# Patient Record
Sex: MALE | Race: BLACK OR AFRICAN AMERICAN | Employment: FULL TIME | URBAN - METROPOLITAN AREA
[De-identification: names, ages, dates, MRNs, and addresses within clinical notes are randomized per-mention and may not be internally consistent; named-entity substitution may affect disease eponyms.]

---

## 2017-12-12 ENCOUNTER — HOSPITAL ENCOUNTER (EMERGENCY)
Age: 32
Discharge: HOME | End: 2017-12-12
Attending: EMERGENCY MEDICINE
Payer: SELF-PAY

## 2017-12-12 VITALS
DIASTOLIC BLOOD PRESSURE: 85 MMHG | HEART RATE: 90 BPM | HEIGHT: 67 IN | OXYGEN SATURATION: 98 % | WEIGHT: 176 LBS | SYSTOLIC BLOOD PRESSURE: 141 MMHG | BODY MASS INDEX: 27.62 KG/M2 | TEMPERATURE: 98.4 F | RESPIRATION RATE: 18 BRPM

## 2017-12-12 DIAGNOSIS — R10.9 NONSPECIFIC ABDOMINAL PAIN: Primary | ICD-10-CM

## 2017-12-12 LAB
ALBUMIN SERPL-MCNC: 4.5 G/DL (ref 3.5–5)
ALBUMIN/GLOB SERPL: 1.1 {RATIO} (ref 1.1–2.2)
ALP SERPL-CCNC: 84 U/L (ref 45–117)
ALT SERPL-CCNC: 39 U/L (ref 12–78)
ANION GAP SERPL CALC-SCNC: 5 MMOL/L (ref 5–15)
AST SERPL-CCNC: 22 U/L (ref 15–37)
BASOPHILS # BLD: 0 K/UL (ref 0–0.1)
BASOPHILS NFR BLD: 0 % (ref 0–1)
BILIRUB SERPL-MCNC: 0.4 MG/DL (ref 0.2–1)
BUN SERPL-MCNC: 6 MG/DL (ref 6–20)
BUN/CREAT SERPL: 7 (ref 12–20)
CALCIUM SERPL-MCNC: 9.8 MG/DL (ref 8.5–10.1)
CHLORIDE SERPL-SCNC: 101 MMOL/L (ref 97–108)
CO2 SERPL-SCNC: 31 MMOL/L (ref 21–32)
CREAT SERPL-MCNC: 0.86 MG/DL (ref 0.7–1.3)
EOSINOPHIL # BLD: 0.1 K/UL (ref 0–0.4)
EOSINOPHIL NFR BLD: 2 % (ref 0–7)
ERYTHROCYTE [DISTWIDTH] IN BLOOD BY AUTOMATED COUNT: 14.2 % (ref 11.5–14.5)
GLOBULIN SER CALC-MCNC: 4.1 G/DL (ref 2–4)
GLUCOSE SERPL-MCNC: 94 MG/DL (ref 65–100)
HCT VFR BLD AUTO: 45.8 % (ref 36.6–50.3)
HGB BLD-MCNC: 15 G/DL (ref 12.1–17)
LIPASE SERPL-CCNC: 58 U/L (ref 73–393)
LYMPHOCYTES # BLD: 2.8 K/UL (ref 0.8–3.5)
LYMPHOCYTES NFR BLD: 32 % (ref 12–49)
MCH RBC QN AUTO: 26.8 PG (ref 26–34)
MCHC RBC AUTO-ENTMCNC: 32.8 G/DL (ref 30–36.5)
MCV RBC AUTO: 81.9 FL (ref 80–99)
MONOCYTES # BLD: 0.7 K/UL (ref 0–1)
MONOCYTES NFR BLD: 8 % (ref 5–13)
NEUTS SEG # BLD: 5.1 K/UL (ref 1.8–8)
NEUTS SEG NFR BLD: 58 % (ref 32–75)
PLATELET # BLD AUTO: 241 K/UL (ref 150–400)
POTASSIUM SERPL-SCNC: 3.7 MMOL/L (ref 3.5–5.1)
PROT SERPL-MCNC: 8.6 G/DL (ref 6.4–8.2)
RBC # BLD AUTO: 5.59 M/UL (ref 4.1–5.7)
SODIUM SERPL-SCNC: 137 MMOL/L (ref 136–145)
WBC # BLD AUTO: 8.7 K/UL (ref 4.1–11.1)

## 2017-12-12 PROCEDURE — 74011250636 HC RX REV CODE- 250/636: Performed by: EMERGENCY MEDICINE

## 2017-12-12 PROCEDURE — 85025 COMPLETE CBC W/AUTO DIFF WBC: CPT | Performed by: EMERGENCY MEDICINE

## 2017-12-12 PROCEDURE — 83690 ASSAY OF LIPASE: CPT | Performed by: EMERGENCY MEDICINE

## 2017-12-12 PROCEDURE — 80053 COMPREHEN METABOLIC PANEL: CPT | Performed by: EMERGENCY MEDICINE

## 2017-12-12 PROCEDURE — 74011000250 HC RX REV CODE- 250: Performed by: EMERGENCY MEDICINE

## 2017-12-12 PROCEDURE — 36415 COLL VENOUS BLD VENIPUNCTURE: CPT | Performed by: EMERGENCY MEDICINE

## 2017-12-12 PROCEDURE — 96361 HYDRATE IV INFUSION ADD-ON: CPT

## 2017-12-12 PROCEDURE — 96374 THER/PROPH/DIAG INJ IV PUSH: CPT

## 2017-12-12 PROCEDURE — 99282 EMERGENCY DEPT VISIT SF MDM: CPT

## 2017-12-12 RX ORDER — FAMOTIDINE 10 MG/ML
20 INJECTION INTRAVENOUS
Status: COMPLETED | OUTPATIENT
Start: 2017-12-12 | End: 2017-12-12

## 2017-12-12 RX ORDER — SODIUM CHLORIDE 0.9 % (FLUSH) 0.9 %
5-10 SYRINGE (ML) INJECTION AS NEEDED
Status: DISCONTINUED | OUTPATIENT
Start: 2017-12-12 | End: 2017-12-12 | Stop reason: HOSPADM

## 2017-12-12 RX ORDER — SODIUM CHLORIDE 0.9 % (FLUSH) 0.9 %
5-10 SYRINGE (ML) INJECTION EVERY 8 HOURS
Status: DISCONTINUED | OUTPATIENT
Start: 2017-12-12 | End: 2017-12-12 | Stop reason: HOSPADM

## 2017-12-12 RX ADMIN — FAMOTIDINE 20 MG: 10 INJECTION INTRAVENOUS at 12:37

## 2017-12-12 RX ADMIN — SODIUM CHLORIDE 1000 ML: 900 INJECTION, SOLUTION INTRAVENOUS at 12:37

## 2017-12-12 NOTE — ED NOTES
Pt given printed discharge instructions and 0 script(s). Pt verbalized understanding of instructions and script(s). Pt verbalized importance of following up with PCP, given a list of PCP providers for uninsured. Pt alert and oriented, in no acute distress, ambulatory with girlfriend. Patient given work note. Patient asked what was wrong with him, I reviewed his normal lab work and advised him that it was likely a GI virus. Patient admits that he eats a lot of fast food and admits that he ate a fast food burger on the way here. I advised him to stop eating fast food because you can't be sure what is in it.

## 2017-12-12 NOTE — DISCHARGE INSTRUCTIONS
Abdominal Pain: Care Instructions  Your Care Instructions    Abdominal pain has many possible causes. Some aren't serious and get better on their own in a few days. Others need more testing and treatment. If your pain continues or gets worse, you need to be rechecked and may need more tests to find out what is wrong. You may need surgery to correct the problem. Don't ignore new symptoms, such as fever, nausea and vomiting, urination problems, pain that gets worse, and dizziness. These may be signs of a more serious problem. Your doctor may have recommended a follow-up visit in the next 8 to 12 hours. If you are not getting better, you may need more tests or treatment. The doctor has checked you carefully, but problems can develop later. If you notice any problems or new symptoms, get medical treatment right away. Follow-up care is a key part of your treatment and safety. Be sure to make and go to all appointments, and call your doctor if you are having problems. It's also a good idea to know your test results and keep a list of the medicines you take. How can you care for yourself at home? · Rest until you feel better. · To prevent dehydration, drink plenty of fluids, enough so that your urine is light yellow or clear like water. Choose water and other caffeine-free clear liquids until you feel better. If you have kidney, heart, or liver disease and have to limit fluids, talk with your doctor before you increase the amount of fluids you drink. · If your stomach is upset, eat mild foods, such as rice, dry toast or crackers, bananas, and applesauce. Try eating several small meals instead of two or three large ones. · Wait until 48 hours after all symptoms have gone away before you have spicy foods, alcohol, and drinks that contain caffeine. · Do not eat foods that are high in fat. · Avoid anti-inflammatory medicines such as aspirin, ibuprofen (Advil, Motrin), and naproxen (Aleve).  These can cause stomach upset. Talk to your doctor if you take daily aspirin for another health problem. When should you call for help? Call 911 anytime you think you may need emergency care. For example, call if:  ? · You passed out (lost consciousness). ? · You pass maroon or very bloody stools. ? · You vomit blood or what looks like coffee grounds. ? · You have new, severe belly pain. ?Call your doctor now or seek immediate medical care if:  ? · Your pain gets worse, especially if it becomes focused in one area of your belly. ? · You have a new or higher fever. ? · Your stools are black and look like tar, or they have streaks of blood. ? · You have unexpected vaginal bleeding. ? · You have symptoms of a urinary tract infection. These may include:  ¨ Pain when you urinate. ¨ Urinating more often than usual.  ¨ Blood in your urine. ? · You are dizzy or lightheaded, or you feel like you may faint. ? Watch closely for changes in your health, and be sure to contact your doctor if:  ? · You are not getting better after 1 day (24 hours). Where can you learn more? Go to http://corey-annie.info/. Enter Y860 in the search box to learn more about \"Abdominal Pain: Care Instructions. \"  Current as of: March 20, 2017  Content Version: 11.4  © 4823-8010 [employer]. Care instructions adapted under license by [employer] (which disclaims liability or warranty for this information). If you have questions about a medical condition or this instruction, always ask your healthcare professional. [place] [address] any warranty or liability for your use of this information.

## 2017-12-12 NOTE — ED NOTES
Emergency Department Nursing Plan of Care       The Nursing Plan of Care is developed from the Nursing assessment and Emergency Department Attending provider initial evaluation. The plan of care may be reviewed in the ED Provider note.     The Plan of Care was developed with the following considerations:   Patient / Family readiness to learn indicated by:verbalized understanding  Persons(s) to be included in education: patient  Barriers to Learning/Limitations:No    Signed     Ash Talavera RN    12/12/2017   1:38 PM

## 2017-12-12 NOTE — LETTER
[facility] EMERGENCY DEPT 
[address] [place] [address] [id number] [phone] Work/School Note Date: 12/12/2017 To Whom It May concern: 
 
Seble Terry was seen and treated today in the emergency room by the following provider(s): 
Attending Provider: Ria Olvera MD.   
 
Seble Terry may return to work on 12/13/17. Sincerely, Vaishali Presume

## 2017-12-12 NOTE — ED PROVIDER NOTES
EMERGENCY DEPARTMENT HISTORY AND PHYSICAL EXAM      Date: 12/12/2017  Patient Name: Yves Machado    History of Presenting Illness     Chief Complaint   Patient presents with    Abdominal Pain       History Provided By: Patient    HPI: Yves Machado, 33 y.o. male with no pertinent pmhx, presents ambulatory to the ED with cc of intermittent mild to moderate diffuse abd cramping x 3 days, worse today. Patient reports no relief with Ibuprofen at home. He state he is in no pain at this time. Pt c/o associated nausea, diarrhea and abd tightening. Pt specifically denies any vomiting, changes in appetite, difficulties urinating, CP, SOB, cough. Pt last moved his bowels today. Pt admits to tobacco use. He denies recent etoh consumption. PCP: None    There are no other complaints, changes, or physical findings at this time. Current Facility-Administered Medications   Medication Dose Route Frequency Provider Last Rate Last Dose    sodium chloride (NS) flush 5-10 mL  5-10 mL IntraVENous Q8H Ana MD Andre        sodium chloride (NS) flush 5-10 mL  5-10 mL IntraVENous PRN Ana Powell MD        sodium chloride 0.9 % bolus infusion 1,000 mL  1,000 mL IntraVENous ONCE Ana MD Andre 1,000 mL/hr at 12/12/17 1237 1,000 mL at 12/12/17 1237     Current Outpatient Prescriptions   Medication Sig Dispense Refill    amoxicillin-clavulanate (AUGMENTIN) 875-125 mg per tablet Take 1 Tab by mouth two (2) times a day. 20 Tab 0    ciprofloxacin HCl (CILOXAN) 0.3 % ophthalmic solution Apply 1 Drop to eye four (4) times daily. 2.5 mL 0    HYDROcodone-acetaminophen (NORCO) 5-325 mg per tablet Take 1 Tab by mouth every six (6) hours as needed for Pain. Max Daily Amount: 4 Tabs. 20 Tab 0    tobramycin (TOBREX) 0.3 % ophthalmic solution Administer 1 Drop to right eye every four (4) hours. 1 Bottle 0       Past History     Past Medical History:  History reviewed. No pertinent past medical history.     Past Surgical History:  History reviewed. No pertinent surgical history. Family History:  History reviewed. No pertinent family history. Social History:  Social History   Substance Use Topics    Smoking status: Current Every Day Smoker     Packs/day: 0.50    Smokeless tobacco: Never Used    Alcohol use Yes       Allergies:  No Known Allergies      Review of Systems   Review of Systems   Constitutional: Negative for fever. HENT: Negative for sore throat and trouble swallowing. Eyes: Negative for photophobia and redness. Respiratory: Negative for cough and shortness of breath. Cardiovascular: Negative for chest pain and leg swelling. Gastrointestinal: Positive for abdominal pain, diarrhea and nausea. Negative for constipation and vomiting. Endocrine: Negative for polydipsia and polyuria. Genitourinary: Negative for difficulty urinating, dysuria, hematuria and scrotal swelling. Musculoskeletal: Negative for back pain and joint swelling. Skin: Negative for rash. Neurological: Negative for dizziness, syncope, weakness and headaches. Psychiatric/Behavioral: Negative for suicidal ideas. All other systems reviewed and are negative. Physical Exam   Physical Exam   Constitutional: He is oriented to person, place, and time. He appears well-developed and well-nourished. No distress. HENT:   Head: Normocephalic and atraumatic. Mouth/Throat: Oropharynx is clear and moist. No oropharyngeal exudate. Eyes: Conjunctivae and EOM are normal. Pupils are equal, round, and reactive to light. Left eye exhibits no discharge. Neck: Normal range of motion. Neck supple. No JVD present. Cardiovascular: Normal rate, regular rhythm, normal heart sounds and intact distal pulses. Pulmonary/Chest: Effort normal and breath sounds normal. No respiratory distress. He has no wheezes. Abdominal: Soft. Bowel sounds are normal. He exhibits no distension. There is tenderness (mild).  There is no rebound and no guarding. Musculoskeletal: Normal range of motion. He exhibits no edema or tenderness. Lymphadenopathy:     He has no cervical adenopathy. Neurological: He is alert and oriented to person, place, and time. He has normal reflexes. No cranial nerve deficit. Skin: Skin is warm and dry. No rash noted. Psychiatric: He has a normal mood and affect. His behavior is normal.   Nursing note and vitals reviewed. Diagnostic Study Results     Labs -     Recent Results (from the past 12 hour(s))   CBC WITH AUTOMATED DIFF    Collection Time: 12/12/17 12:36 PM   Result Value Ref Range    WBC 8.7 4.1 - 11.1 K/uL    RBC 5.59 4. 10 - 5.70 M/uL    HGB 15.0 12.1 - 17.0 g/dL    HCT 45.8 36.6 - 50.3 %    MCV 81.9 80.0 - 99.0 FL    MCH 26.8 26.0 - 34.0 PG    MCHC 32.8 30.0 - 36.5 g/dL    RDW 14.2 11.5 - 14.5 %    PLATELET 918 189 - 773 K/uL    NEUTROPHILS 58 32 - 75 %    LYMPHOCYTES 32 12 - 49 %    MONOCYTES 8 5 - 13 %    EOSINOPHILS 2 0 - 7 %    BASOPHILS 0 0 - 1 %    ABS. NEUTROPHILS 5.1 1.8 - 8.0 K/UL    ABS. LYMPHOCYTES 2.8 0.8 - 3.5 K/UL    ABS. MONOCYTES 0.7 0.0 - 1.0 K/UL    ABS. EOSINOPHILS 0.1 0.0 - 0.4 K/UL    ABS. BASOPHILS 0.0 0.0 - 0.1 K/UL   METABOLIC PANEL, COMPREHENSIVE    Collection Time: 12/12/17 12:36 PM   Result Value Ref Range    Sodium 137 136 - 145 mmol/L    Potassium 3.7 3.5 - 5.1 mmol/L    Chloride 101 97 - 108 mmol/L    CO2 31 21 - 32 mmol/L    Anion gap 5 5 - 15 mmol/L    Glucose 94 65 - 100 mg/dL    BUN 6 6 - 20 MG/DL    Creatinine 0.86 0.70 - 1.30 MG/DL    BUN/Creatinine ratio 7 (L) 12 - 20      GFR est AA >60 >60 ml/min/1.73m2    GFR est non-AA >60 >60 ml/min/1.73m2    Calcium 9.8 8.5 - 10.1 MG/DL    Bilirubin, total 0.4 0.2 - 1.0 MG/DL    ALT (SGPT) 39 12 - 78 U/L    AST (SGOT) 22 15 - 37 U/L    Alk.  phosphatase 84 45 - 117 U/L    Protein, total 8.6 (H) 6.4 - 8.2 g/dL    Albumin 4.5 3.5 - 5.0 g/dL    Globulin 4.1 (H) 2.0 - 4.0 g/dL    A-G Ratio 1.1 1.1 - 2.2     LIPASE    Collection Time: 12/12/17 12:36 PM   Result Value Ref Range    Lipase 58 (L) 73 - 393 U/L       Radiologic Studies -   No orders to display     CT Results  (Last 48 hours)    None        CXR Results  (Last 48 hours)    None            Medical Decision Making   I am the first provider for this patient. I reviewed the vital signs, available nursing notes, past medical history, past surgical history, family history and social history. Vital Signs-Reviewed the patient's vital signs. Patient Vitals for the past 12 hrs:   Temp Pulse Resp BP SpO2   12/12/17 1210 98.4 °F (36.9 °C) 90 18 141/85 98 %       Pulse Oximetry Analysis - 98% on RA    Cardiac Monitor:   Rate: 85 bpm  Rhythm: Normal Sinus Rhythm      Records Reviewed: Nursing Notes and Old Medical Records    Provider Notes (Medical Decision Making): gastritis, diverticulitis, kidney stone, pancreatitis     ED Course:   Initial assessment performed. The patients presenting problems have been discussed, and they are in agreement with the care plan formulated and outlined with them. I have encouraged them to ask questions as they arise throughout their visit. Critical Care Time: none    Disposition:    Discharge Note:  1:29 PM  The pt is ready for discharge. The pt's signs, symptoms, diagnosis, and discharge instructions have been discussed and pt has conveyed their understanding. The pt is to follow up as recommended or return to ER should their symptoms worsen. Plan has been discussed and pt is in agreement. PLAN:  1. Current Discharge Medication List        2. Follow-up Information     Follow up With Details Comments Contact Info    None   None ([phone]) Patient stated that they have no PCP      [facility] EMERGENCY DEPT In 3 days  [address]        Return to ED if worse     Diagnosis     Clinical Impression:   1. Nonspecific abdominal pain        Attestations:     This note is prepared by Iker Butler, acting as Scribe for Jerrell Shay Keith MD.       The scribe's documentation has been prepared under my direction and personally reviewed by me in its entirety. I confirm that the note above accurately reflects all work, treatment, procedures, and medical decision making performed by me.

## 2018-08-23 ENCOUNTER — HOSPITAL ENCOUNTER (EMERGENCY)
Age: 33
Discharge: HOME | End: 2018-08-23
Attending: EMERGENCY MEDICINE
Payer: SELF-PAY

## 2018-08-23 VITALS
WEIGHT: 162.5 LBS | TEMPERATURE: 97.6 F | DIASTOLIC BLOOD PRESSURE: 74 MMHG | BODY MASS INDEX: 25.51 KG/M2 | SYSTOLIC BLOOD PRESSURE: 104 MMHG | HEART RATE: 76 BPM | OXYGEN SATURATION: 100 % | HEIGHT: 67 IN | RESPIRATION RATE: 17 BRPM

## 2018-08-23 DIAGNOSIS — G89.29 CHRONIC PAIN IN LEFT FOOT: Primary | ICD-10-CM

## 2018-08-23 DIAGNOSIS — M79.672 CHRONIC PAIN IN LEFT FOOT: Primary | ICD-10-CM

## 2018-08-23 PROCEDURE — 74011250637 HC RX REV CODE- 250/637: Performed by: PHYSICIAN ASSISTANT

## 2018-08-23 PROCEDURE — 99283 EMERGENCY DEPT VISIT LOW MDM: CPT

## 2018-08-23 RX ORDER — IBUPROFEN 400 MG/1
800 TABLET ORAL
Status: COMPLETED | OUTPATIENT
Start: 2018-08-23 | End: 2018-08-23

## 2018-08-23 RX ORDER — IBUPROFEN 800 MG/1
800 TABLET ORAL
Qty: 20 TAB | Refills: 0 | OUTPATIENT
Start: 2018-08-23 | End: 2020-07-20

## 2018-08-23 RX ADMIN — IBUPROFEN 800 MG: 400 TABLET, FILM COATED ORAL at 15:25

## 2018-08-23 NOTE — LETTER
[address] EMERGENCY DEPT 
[address] [place] [address] [facility] [id number] 
[phone] Work/School Note Date: 8/23/2018 To Whom It May concern: 
 
Santiago Sr was seen and treated today in the emergency room by the following provider(s): 
Attending Provider: Sven Lima MD 
Physician Assistant: Sandy Bennett PA-C. Santiago Sr may return to work on 8/25/18. Sincerely, Sandy Bennett PA-C

## 2018-08-23 NOTE — DISCHARGE INSTRUCTIONS
Foot Pain: Care Instructions  Your Care Instructions  Foot injuries that cause pain and swelling are fairly common. Almost all sports or home repair projects can cause a misstep that ends up as foot pain. Normal wear and tear, especially as you get older, also can cause foot pain. Most minor foot injuries will heal on their own, and home treatment is usually all you need to do. If you have a severe injury, you may need tests and treatment. Follow-up care is a key part of your treatment and safety. Be sure to make and go to all appointments, and call your doctor if you are having problems. It's also a good idea to know your test results and keep a list of the medicines you take. How can you care for yourself at home? · Take pain medicines exactly as directed. ¨ If the doctor gave you a prescription medicine for pain, take it as prescribed. ¨ If you are not taking a prescription pain medicine, ask your doctor if you can take an over-the-counter medicine. · Rest and protect your foot. Take a break from any activity that may cause pain. · Put ice or a cold pack on your foot for 10 to 20 minutes at a time. Put a thin cloth between the ice and your skin. · Prop up the sore foot on a pillow when you ice it or anytime you sit or lie down during the next 3 days. Try to keep it above the level of your heart. This will help reduce swelling. · Your doctor may recommend that you wrap your foot with an elastic bandage. Keep your foot wrapped for as long as your doctor advises. · If your doctor recommends crutches, use them as directed. · Wear roomy footwear. · As soon as pain and swelling end, begin gentle exercises of your foot. Your doctor can tell you which exercises will help. When should you call for help? Call 911 anytime you think you may need emergency care.  For example, call if:    · Your foot turns pale, white, blue, or cold.    Call your doctor now or seek immediate medical care if:    · You cannot move or stand on your foot.     · Your foot looks twisted or out of its normal position.     · Your foot is not stable when you step down.     · You have signs of infection, such as:  ¨ Increased pain, swelling, warmth, or redness. ¨ Red streaks leading from the sore area. ¨ Pus draining from a place on your foot. ¨ A fever.     · Your foot is numb or tingly.    Watch closely for changes in your health, and be sure to contact your doctor if:    · You do not get better as expected.     · You have bruises from an injury that last longer than 2 weeks. Where can you learn more? Go to http://corey-annie.info/. Enter W550 in the search box to learn more about \"Foot Pain: Care Instructions. \"  Current as of: November 29, 2017  Content Version: 11.7  © 6258-7751 [employer]. Care instructions adapted under license by [employer] (which disclaims liability or warranty for this information). If you have questions about a medical condition or this instruction, always ask your healthcare professional. [place] [address] any warranty or liability for your use of this information. Chronic Pain: Care Instructions  Your Care Instructions    Chronic pain is pain that lasts a long time (months or even years) and may or may not have a clear cause. It is different from acute pain, which usually does have a clear cause-like an injury or illness-and gets better over time. Chronic pain:  · Lasts over time but may vary from day to day. · Does not go away despite efforts to end it. · May disrupt your sleep and lead to fatigue. · May cause depression or anxiety. · May make your muscles tense, causing more pain. · Can disrupt your work, hobbies, home life, and relationships with friends and family. Chronic pain is a very real condition. It is not just in your head.  Treatment can help and usually includes several methods used together, such as medicines, physical therapy, exercise, and other treatments. Learning how to relax and changing negative thought patterns can also help you cope. Chronic pain is complex. Taking an active role in your treatment will help you better manage your pain. Tell your doctor if you have trouble dealing with your pain. You may have to try several things before you find what works best for you. Follow-up care is a key part of your treatment and safety. Be sure to make and go to all appointments, and call your doctor if you are having problems. It's also a good idea to know your test results and keep a list of the medicines you take. How can you care for yourself at home? · Pace yourself. Break up large jobs into smaller tasks. Save harder tasks for days when you have less pain, or go back and forth between hard tasks and easier ones. Take rest breaks. · Relax, and reduce stress. Relaxation techniques such as deep breathing or meditation can help. · Keep moving. Gentle, daily exercise can help reduce pain over the long run. Try low- or no-impact exercises such as walking, swimming, and stationary biking. Do stretches to stay flexible. · Try heat, cold packs, and massage. · Get enough sleep. Chronic pain can make you tired and drain your energy. Talk with your doctor if you have trouble sleeping because of pain. · Think positive. Your thoughts can affect your pain level. Do things that you enjoy to distract yourself when you have pain instead of focusing on the pain. See a movie, read a book, listen to music, or spend time with a friend. · If you think you are depressed, talk to your doctor about treatment. · Keep a daily pain diary. Record how your moods, thoughts, sleep patterns, activities, and medicine affect your pain. You may find that your pain is worse during or after certain activities or when you are feeling a certain emotion.  Having a record of your pain can help you and your doctor find the best ways to treat your pain.  · Take pain medicines exactly as directed. ¨ If the doctor gave you a prescription medicine for pain, take it as prescribed. ¨ If you are not taking a prescription pain medicine, ask your doctor if you can take an over-the-counter medicine. Reducing constipation caused by pain medicine  · Include fruits, vegetables, beans, and whole grains in your diet each day. These foods are high in fiber. · Drink plenty of fluids, enough so that your urine is light yellow or clear like water. If you have kidney, heart, or liver disease and have to limit fluids, talk with your doctor before you increase the amount of fluids you drink. · If your doctor recommends it, get more exercise. Walking is a good choice. Bit by bit, increase the amount you walk every day. Try for at least 30 minutes on most days of the week. · Schedule time each day for a bowel movement. A daily routine may help. Take your time and do not strain when having a bowel movement. When should you call for help? Call your doctor now or seek immediate medical care if:    · Your pain gets worse or is out of control.     · You feel down or blue, or you do not enjoy things like you once did. You may be depressed, which is common in people with chronic pain. Depression can be treated.     · You have vomiting or cramps for more than 2 hours.    Watch closely for changes in your health, and be sure to contact your doctor if:    · You cannot sleep because of pain.     · You are very worried or anxious about your pain.     · You have trouble taking your pain medicine.     · You have any concerns about your pain medicine.     · You have trouble with bowel movements, such as:  ¨ No bowel movement in 3 days. ¨ Blood in the anal area, in your stool, or on the toilet paper. ¨ Diarrhea for more than 24 hours. Where can you learn more? Go to http://corey-annie.info/.   Enter N004 in the search box to learn more about \"Chronic Pain: Care Instructions. \"  Current as of: October 9, 2017  Content Version: 11.7  © 6508-5026 [employer], [facility]. Care instructions adapted under license by [employer] (which disclaims liability or warranty for this information). If you have questions about a medical condition or this instruction, always ask your healthcare professional. [place] [address] any warranty or liability for your use of this information.

## 2018-08-23 NOTE — ED NOTES
Emergency Department Nursing Plan of Care       The Nursing Plan of Care is developed from the Nursing assessment and Emergency Department Attending provider initial evaluation. The plan of care may be reviewed in the ED Provider note.     The Plan of Care was developed with the following considerations:   Patient / Family readiness to learn indicated by:verbalized understanding  Persons(s) to be included in education: patient  Barriers to Learning/Limitations:No    Signed     Ralf Feliciano [facility] CELY-CRANBERRY-ER    8/23/2018   2:55 PM

## 2018-08-23 NOTE — ED NOTES
Patient (s)  given copy of dc instructions and 0 paper script(s) and 1 electronic scripts. Patient (s)  verbalized understanding of instructions and script (s). Patient given a current medication reconciliation form and verbalized understanding of their medications. Patient (s) verbalized understanding of the importance of discussing medications with  his or her physician or clinic they will be following up with. Patient alert and oriented and in no acute distress. Patient offered wheelchair from treatment area to hospital entrance, patient refused wheelchair.
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
## 2018-11-01 ENCOUNTER — HOSPITAL ENCOUNTER (EMERGENCY)
Age: 33
Discharge: HOME | End: 2018-11-01
Attending: EMERGENCY MEDICINE
Payer: SELF-PAY

## 2018-11-01 VITALS
RESPIRATION RATE: 18 BRPM | WEIGHT: 170 LBS | BODY MASS INDEX: 28.32 KG/M2 | HEART RATE: 91 BPM | SYSTOLIC BLOOD PRESSURE: 132 MMHG | OXYGEN SATURATION: 99 % | HEIGHT: 65 IN | DIASTOLIC BLOOD PRESSURE: 89 MMHG | TEMPERATURE: 99 F

## 2018-11-01 DIAGNOSIS — L03.115 CELLULITIS OF RIGHT LOWER EXTREMITY: Primary | ICD-10-CM

## 2018-11-01 PROCEDURE — 99282 EMERGENCY DEPT VISIT SF MDM: CPT

## 2018-11-01 RX ORDER — CEPHALEXIN 500 MG/1
500 CAPSULE ORAL 4 TIMES DAILY
Qty: 40 CAP | Refills: 0 | Status: SHIPPED | OUTPATIENT
Start: 2018-11-01 | End: 2018-11-11

## 2018-11-01 NOTE — LETTER
[facility] EMERGENCY DEPT 
[address] [id number] [phone] Work/School Note Date: 11/1/2018 To Whom It May concern: 
 
Brady Zhong was seen and treated today in the emergency room by the following provider(s): 
Attending Provider: Jud Duarte MD 
Physician Assistant: SEEMA Chang. Brady Zhong may return to work on 11/3/18. Sincerely, SEEMA Dan

## 2018-11-01 NOTE — ED PROVIDER NOTES
EMERGENCY DEPARTMENT HISTORY AND PHYSICAL EXAM 
 
Date: 11/1/2018 Patient Name: Carolina Sloan History of Presenting Illness Chief Complaint Patient presents with  Ankle Pain History Provided By: Patient HPI: Carolina Sloan is a 29 y.o. male with a PMH of No significant past medical history who presents with cc of right ankle pain and swelling for the past 3 days. Pt notes he had bug bites around the same area 1-2 days prior to the pain. Pt admits to scratching the area. He denies any twisting, falls or trauma to his ankle. He specifically denies any fevers, chills, nausea, vomiting, chest pain, shortness of breath, headache, rash, diarrhea, sweating or weight loss. Pt has been placing a heating pad on his ankle. He is able to ambulate with no difficulty All other ROS negative at this time Pt is in no acute distress and is speaking in full sentences PCP: None Current Outpatient Medications Medication Sig Dispense Refill  ibuprofen (MOTRIN) 800 mg tablet Take 1 Tab by mouth every eight (8) hours as needed for Pain. 20 Tab 0 Past History Past Medical History: No past medical history on file. Past Surgical History: No past surgical history on file. Family History: No family history on file. Social History: 
Social History Tobacco Use  Smoking status: Current Every Day Smoker Packs/day: 0.50  Smokeless tobacco: Never Used Substance Use Topics  Alcohol use: Yes  Drug use: No  
 
 
Allergies: 
No Known Allergies Review of Systems Review of Systems Constitutional: Negative. Negative for chills and fever. HENT: Negative. Eyes: Negative. Respiratory: Negative. Negative for shortness of breath. Cardiovascular: Negative. Negative for chest pain. Gastrointestinal: Negative. Negative for abdominal pain, diarrhea, nausea and vomiting. Endocrine: Negative. Genitourinary: Negative. Musculoskeletal: Positive for arthralgias and joint swelling. Skin: Positive for color change and rash. Allergic/Immunologic: Negative. Neurological: Negative. Negative for headaches. Hematological: Negative. Psychiatric/Behavioral: Negative. All other systems reviewed and are negative. Physical Exam  
 
Vitals:  
 11/01/18 1211 BP: 132/89 Pulse: 91  
Resp: 18 Temp: 99 °F (37.2 °C) SpO2: 99% Weight: 77.1 kg (170 lb) Height: 5' 5\" (1.651 m) Physical Exam  
Constitutional: He is oriented to person, place, and time. He appears well-developed and well-nourished. No distress. HENT:  
Head: Normocephalic and atraumatic. Right Ear: External ear normal.  
Left Ear: External ear normal.  
Nose: Nose normal.  
Mouth/Throat: Oropharynx is clear and moist.  
Eyes: Conjunctivae and EOM are normal. Pupils are equal, round, and reactive to light. Neck: Normal range of motion. Neck supple. Cardiovascular: Normal rate, regular rhythm, normal heart sounds and intact distal pulses. Pulmonary/Chest: Effort normal and breath sounds normal. No respiratory distress. He has no wheezes. He has no rales. Abdominal: Soft. Bowel sounds are normal. He exhibits no distension. There is no tenderness. There is no rebound, no guarding, no CVA tenderness, no tenderness at McBurney's point and negative Mckoy's sign. Musculoskeletal: Normal range of motion. Right ankle: He exhibits swelling. He exhibits normal range of motion, no ecchymosis, no deformity, no laceration and normal pulse. Tenderness. No lateral malleolus, no medial malleolus, no AITFL, no CF ligament, no posterior TFL, no head of 5th metatarsal and no proximal fibula tenderness found. Achilles tendon normal.  
     Left ankle: Normal.  
     Right lower leg: Normal. He exhibits no tenderness, no bony tenderness, no swelling, no edema, no deformity and no laceration. Left lower leg: Normal. He exhibits no tenderness, no bony tenderness, no swelling, no edema, no deformity and no laceration. Right foot: Normal. There is normal range of motion, no tenderness, no bony tenderness, no swelling, normal capillary refill, no crepitus, no deformity and no laceration. Left foot: Normal. There is normal range of motion, no tenderness, no bony tenderness, no swelling, normal capillary refill, no crepitus, no deformity and no laceration. Feet: 
 
Lymphadenopathy:  
  He has no cervical adenopathy. Neurological: He is alert and oriented to person, place, and time. He has normal reflexes. He displays normal reflexes. No cranial nerve deficit. He exhibits normal muscle tone. Coordination normal.  
Skin: No rash noted. He is not diaphoretic. Psychiatric: He has a normal mood and affect. His behavior is normal. Judgment and thought content normal.  
Nursing note and vitals reviewed. Diagnostic Study Results Labs - No results found for this or any previous visit (from the past 12 hour(s)). Radiologic Studies - No orders to display CT Results  (Last 48 hours) None CXR Results  (Last 48 hours) None Medical Decision Making I am the first provider for this patient. I reviewed the vital signs, available nursing notes, past medical history, past surgical history, family history and social history. Vital Signs-Reviewed the patient's vital signs. Records Reviewed: Nursing Notes, Old Medical Records, Previous Radiology Studies and Previous Laboratory Studies Disposition: 
Discharge DISCHARGE NOTE:  
Care plan outlined and precautions discussed. Patient has no new complaints, changes, or physical findings. Results of visit were reviewed with the patient. All medications were reviewed with the patient; will d/c home. All of pt's questions and concerns were addressed.  Patient was instructed and agrees to follow up with pcp, as well as to return to the ED upon further deterioration. Patient is ready to go home. Follow-up Information None Current Discharge Medication List  
  
 
 
Provider Notes (Medical Decision Making):  
Ddx: abscess, cellulitis, allergic reaction, contusion Worsening si/sxs discussed extensively Follow up with PCP or RTC if symptoms/signs worsen Side effects of medication discussed Education materials provided at discharge Pt verbalizes agreement with plan 
 
Procedures: 
Procedures Diagnosis Clinical Impression: 1. Cellulitis of right lower extremity

## 2018-11-01 NOTE — ED NOTES
Pt ambulatory in ER with c/o rt ankle pain x 3 days,denies taking any meds. Emergency Department Nursing Plan of Care The Nursing Plan of Care is developed from the Nursing assessment and Emergency Department Attending provider initial evaluation. The plan of care may be reviewed in the ED Provider note. The Plan of Care was developed with the following considerations:  
Patient / Family readiness to learn indicated by:verbalized understanding Persons(s) to be included in education: patient Barriers to Learning/Limitations:No 
 
Signed Roxanne Kessler, RN   
11/1/2018   1:00 PM

## 2018-11-01 NOTE — ED TRIAGE NOTES
Patient reports atraumatic right ankle pain and swelling x 2-3 days, pt has several bug bites to the ankle.

## 2018-11-01 NOTE — DISCHARGE INSTRUCTIONS

## 2019-06-21 ENCOUNTER — HOSPITAL ENCOUNTER (EMERGENCY)
Age: 34
Discharge: HOME | End: 2019-06-21
Attending: EMERGENCY MEDICINE
Payer: SELF-PAY

## 2019-06-21 VITALS
WEIGHT: 164 LBS | DIASTOLIC BLOOD PRESSURE: 80 MMHG | BODY MASS INDEX: 25.74 KG/M2 | OXYGEN SATURATION: 100 % | RESPIRATION RATE: 16 BRPM | HEIGHT: 67 IN | SYSTOLIC BLOOD PRESSURE: 134 MMHG | TEMPERATURE: 98.4 F | HEART RATE: 83 BPM

## 2019-06-21 DIAGNOSIS — J03.90 ACUTE TONSILLITIS, UNSPECIFIED ETIOLOGY: Primary | ICD-10-CM

## 2019-06-21 LAB — DEPRECATED S PYO AG THROAT QL EIA: NEGATIVE

## 2019-06-21 PROCEDURE — 99283 EMERGENCY DEPT VISIT LOW MDM: CPT

## 2019-06-21 PROCEDURE — 87880 STREP A ASSAY W/OPTIC: CPT

## 2019-06-21 PROCEDURE — 87070 CULTURE OTHR SPECIMN AEROBIC: CPT

## 2019-06-21 PROCEDURE — 74011250637 HC RX REV CODE- 250/637: Performed by: NURSE PRACTITIONER

## 2019-06-21 RX ORDER — DEXAMETHASONE SODIUM PHOSPHATE 10 MG/ML
10 INJECTION INTRAMUSCULAR; INTRAVENOUS ONCE
Status: DISCONTINUED | OUTPATIENT
Start: 2019-06-21 | End: 2019-06-21

## 2019-06-21 RX ORDER — DEXAMETHASONE SODIUM PHOSPHATE 10 MG/ML
10 INJECTION INTRAMUSCULAR; INTRAVENOUS ONCE
Status: COMPLETED | OUTPATIENT
Start: 2019-06-21 | End: 2019-06-21

## 2019-06-21 RX ADMIN — DEXAMETHASONE SODIUM PHOSPHATE 10 MG: 10 INJECTION INTRAMUSCULAR; INTRAVENOUS at 09:34

## 2019-06-21 NOTE — DISCHARGE INSTRUCTIONS
Patient Education        Tonsillitis: Care Instructions  Your Care Instructions    Tonsillitis is an infection of the tonsils that is caused by bacteria or a virus. The tonsils are in the back of the throat and are part of the immune system. Tonsillitis typically lasts from a few days up to a couple of weeks. Tonsillitis caused by a virus goes away on its own. Tonsillitis caused by the bacteria that causes strep throat is treated with antibiotics. You and your doctor may consider surgery to remove the tonsils (tonsillectomy) if you have serious complications or repeat infections. Follow-up care is a key part of your treatment and safety. Be sure to make and go to all appointments, and call your doctor if you are having problems. It's also a good idea to know your test results and keep a list of the medicines you take. How can you care for yourself at home? · If your doctor prescribed antibiotics, take them as directed. Do not stop taking them just because you feel better. You need to take the full course of antibiotics. · Gargle with warm salt water. This helps reduce swelling and relieve discomfort. Gargle once an hour with 1 teaspoon of salt mixed in 8 fluid ounces of warm water. · Take an over-the-counter pain medicine, such as acetaminophen (Tylenol), ibuprofen (Advil, Motrin), or naproxen (Aleve). Be safe with medicines. Read and follow all instructions on the label. No one younger than 20 should take aspirin. It has been linked to Reye syndrome, a serious illness. · Be careful when taking over-the-counter cold or flu medicines and Tylenol at the same time. Many of these medicines have acetaminophen, which is Tylenol. Read the labels to make sure that you are not taking more than the recommended dose. Too much acetaminophen (Tylenol) can be harmful. · Try an over-the-counter throat spray to relieve throat pain. · Drink plenty of fluids. Fluids may help soothe an irritated throat.  Drink warm or cool liquids (whichever feels better). These include tea, soup, and juice. · Do not smoke, and avoid secondhand smoke. Smoking can make tonsillitis worse. If you need help quitting, talk to your doctor about stop-smoking programs and medicines. These can increase your chances of quitting for good. · Use a vaporizer or humidifier to add moisture to your bedroom. Follow the directions for cleaning the machine. When should you call for help? Call your doctor now or seek immediate medical care if:    · Your pain gets worse on one side of your throat.     · You have a new or higher fever.     · You notice changes in your voice.     · You have trouble opening your mouth.     · You have any trouble breathing.     · You have much more trouble swallowing.     · You have a fever with a stiff neck or a severe headache.     · You are sensitive to light or feel very sleepy or confused.    Watch closely for changes in your health, and be sure to contact your doctor if:    · You do not get better after 2 days. Where can you learn more? Go to http://corey-annie.info/. Enter C935 in the search box to learn more about \"Tonsillitis: Care Instructions. \"  Current as of: March 27, 2018  Content Version: 11.9  © 3657-5312 [employer]. Care instructions adapted under license by [employer] (which disclaims liability or warranty for this information). If you have questions about a medical condition or this instruction, always ask your healthcare professional. [place] [address] any warranty or liability for your use of this information.

## 2019-06-21 NOTE — ED NOTES
Emergency Department Nursing Plan of Care       The Nursing Plan of Care is developed from the Nursing assessment and Emergency Department Attending provider initial evaluation. The plan of care may be reviewed in the ED Provider note.     The Plan of Care was developed with the following considerations:   Patient / Family readiness to learn indicated by:verbalized understanding  Persons(s) to be included in education: patient  Barriers to Learning/Limitations:No    Signed     Dania Tee RN    6/21/2019   9:19 AM

## 2019-06-21 NOTE — ED PROVIDER NOTES
EMERGENCY DEPARTMENT HISTORY AND PHYSICAL EXAM    Date: 6/21/2019  Patient Name: Susu Castillo    History of Presenting Illness     Chief Complaint   Patient presents with    Sore Throat         History Provided By: Patient    Chief Complaint: sore throat      HPI: Susu Castillo is a 29 y.o. male with a PMH of No significant past medical history who presents with sore throat acute onset last week. He states throat pain went away and then reoccurred today. Patient states he has been gargling without relief. Patient describes pain as scratching and burning. Pain is 8 out of 10 in severity. He reports it is painful to swallow. He denies fever or cough swollen glands. PCP: None    Current Outpatient Medications   Medication Sig Dispense Refill    ibuprofen (MOTRIN) 800 mg tablet Take 1 Tab by mouth every eight (8) hours as needed for Pain. 20 Tab 0       Past History     Past Medical History:  History reviewed. No pertinent past medical history. Past Surgical History:  History reviewed. No pertinent surgical history. Family History:  History reviewed. No pertinent family history. Social History:  Social History     Tobacco Use    Smoking status: Current Every Day Smoker     Packs/day: 0.50    Smokeless tobacco: Never Used   Substance Use Topics    Alcohol use: Yes    Drug use: No       Allergies:  No Known Allergies      Review of Systems   Review of Systems   Constitutional: Negative for chills, fatigue and fever. HENT: Positive for sore throat. Negative for congestion. Respiratory: Negative for cough and shortness of breath. Cardiovascular: Negative for chest pain. Gastrointestinal: Negative for abdominal pain. Musculoskeletal: Negative for arthralgias, back pain, myalgias, neck pain and neck stiffness. Skin: Negative for rash. Neurological: Negative for dizziness, light-headedness and headaches. All other systems reviewed and are negative.       Physical Exam     Vitals: 06/21/19 0854   BP: 134/80   Pulse: 83   Resp: 16   Temp: 98.4 °F (36.9 °C)   SpO2: 100%   Weight: 74.4 kg (164 lb)   Height: 5' 7\" (1.702 m)     Physical Exam   Constitutional: He is oriented to person, place, and time. He appears well-developed and well-nourished. HENT:   Head: Normocephalic and atraumatic. Right Ear: External ear normal.   Left Ear: External ear normal.   Mouth/Throat: Uvula is midline. Oropharyngeal exudate and posterior oropharyngeal erythema present. Eyes: Conjunctivae are normal. Right eye exhibits no discharge. Left eye exhibits no discharge. Neck: Normal range of motion. Neck supple. Cardiovascular: Normal rate and regular rhythm. Pulmonary/Chest: Effort normal and breath sounds normal. No respiratory distress. He has no wheezes. Abdominal: Soft. Bowel sounds are normal. There is no tenderness. Musculoskeletal: Normal range of motion. He exhibits no edema. Lymphadenopathy:     He has no cervical adenopathy. Neurological: He is alert and oriented to person, place, and time. No cranial nerve deficit. Skin: Skin is warm and dry. Psychiatric: He has a normal mood and affect. His behavior is normal. Judgment and thought content normal.   Nursing note and vitals reviewed. Diagnostic Study Results     Labs -     Recent Results (from the past 12 hour(s))   STREP AG SCREEN, GROUP A    Collection Time: 06/21/19  9:01 AM   Result Value Ref Range    Group A Strep Ag ID NEGATIVE  NEG         Radiologic Studies -   No orders to display     CT Results  (Last 48 hours)    None        CXR Results  (Last 48 hours)    None            Medical Decision Making   I am the first provider for this patient. I reviewed the vital signs, available nursing notes, past medical history, past surgical history, family history and social history. Vital Signs-Reviewed the patient's vital signs.     Records Reviewed: Nursing Notes            Disposition:  home    DISCHARGE NOTE:   home Care plan outlined and precautions discussed. Patient has no new complaints, changes, or physical findings. Results of tests   were reviewed with the patient. All medications were reviewed with the patient; will d/c home with amoxicillin motrin. All of pt's questions and concerns were addressed. Patient was instructed and agrees to follow up with PCP, as well as to return to the ED upon further deterioration. Patient is ready to go home. Follow-up Information     Follow up With Specialties Details Why Contact Info    [facility]  In 2 days  [address] [facility] [id number]          Discharge Medication List as of 6/21/2019  9:43 AM          Provider Notes (Medical Decision Making):   DDX pharyngitis viral pharyngitis tonsillitis procedures:  Procedures        Diagnosis     Clinical Impression:   1.  Acute tonsillitis, unspecified etiology

## 2019-06-23 LAB
BACTERIA SPEC CULT: NORMAL
SERVICE CMNT-IMP: NORMAL

## 2020-07-20 ENCOUNTER — HOSPITAL ENCOUNTER (EMERGENCY)
Age: 35
Discharge: HOME | End: 2020-07-20
Attending: EMERGENCY MEDICINE
Payer: COMMERCIAL

## 2020-07-20 VITALS
RESPIRATION RATE: 16 BRPM | DIASTOLIC BLOOD PRESSURE: 83 MMHG | HEART RATE: 76 BPM | BODY MASS INDEX: 27 KG/M2 | WEIGHT: 172 LBS | SYSTOLIC BLOOD PRESSURE: 144 MMHG | OXYGEN SATURATION: 98 % | TEMPERATURE: 97.9 F | HEIGHT: 67 IN

## 2020-07-20 DIAGNOSIS — N34.2 URETHRITIS: Primary | ICD-10-CM

## 2020-07-20 LAB
APPEARANCE UR: CLEAR
BACTERIA URNS QL MICRO: NEGATIVE /HPF
BILIRUB UR QL CFM: NEGATIVE
COLOR UR: ABNORMAL
EPITH CASTS URNS QL MICRO: ABNORMAL /LPF
GLUCOSE UR STRIP.AUTO-MCNC: NEGATIVE MG/DL
HGB UR QL STRIP: NEGATIVE
KETONES UR QL STRIP.AUTO: ABNORMAL MG/DL
LEUKOCYTE ESTERASE UR QL STRIP.AUTO: ABNORMAL
NITRITE UR QL STRIP.AUTO: NEGATIVE
PH UR STRIP: 6.5 [PH] (ref 5–8)
PROT UR STRIP-MCNC: 30 MG/DL
RBC #/AREA URNS HPF: ABNORMAL /HPF (ref 0–5)
SP GR UR REFRACTOMETRY: 1.02 (ref 1–1.03)
UA: UC IF INDICATED,UAUC: ABNORMAL
UROBILINOGEN UR QL STRIP.AUTO: 1 EU/DL (ref 0.2–1)
WBC URNS QL MICRO: ABNORMAL /HPF (ref 0–4)

## 2020-07-20 PROCEDURE — 87086 URINE CULTURE/COLONY COUNT: CPT

## 2020-07-20 PROCEDURE — 96372 THER/PROPH/DIAG INJ SC/IM: CPT

## 2020-07-20 PROCEDURE — 74011000250 HC RX REV CODE- 250: Performed by: NURSE PRACTITIONER

## 2020-07-20 PROCEDURE — 74011250637 HC RX REV CODE- 250/637: Performed by: NURSE PRACTITIONER

## 2020-07-20 PROCEDURE — 87491 CHLMYD TRACH DNA AMP PROBE: CPT

## 2020-07-20 PROCEDURE — 74011250636 HC RX REV CODE- 250/636: Performed by: NURSE PRACTITIONER

## 2020-07-20 PROCEDURE — 99283 EMERGENCY DEPT VISIT LOW MDM: CPT

## 2020-07-20 PROCEDURE — 81001 URINALYSIS AUTO W/SCOPE: CPT

## 2020-07-20 RX ORDER — CEPHALEXIN 500 MG/1
500 CAPSULE ORAL 2 TIMES DAILY
Qty: 14 CAP | Refills: 0 | Status: SHIPPED | OUTPATIENT
Start: 2020-07-20 | End: 2020-07-27

## 2020-07-20 RX ORDER — AZITHROMYCIN 500 MG/1
1000 TABLET, FILM COATED ORAL
Status: COMPLETED | OUTPATIENT
Start: 2020-07-20 | End: 2020-07-20

## 2020-07-20 RX ADMIN — AZITHROMYCIN MONOHYDRATE 1000 MG: 500 TABLET ORAL at 15:42

## 2020-07-20 RX ADMIN — LIDOCAINE HYDROCHLORIDE 250 MG: 10 INJECTION, SOLUTION EPIDURAL; INFILTRATION; INTRACAUDAL; PERINEURAL at 15:42

## 2020-07-20 NOTE — ED NOTES
Provider at bedside reviewing plan of care and F/U instruction. Pt accepted plan of care and left unit steady gait. Patient (s)  given copy of dc instructions and 1 script(s). Patient (s)  verbalized understanding of instructions and script (s). Patient given a current medication reconciliation form and verbalized understanding of their medications. Patient (s) verbalized understanding of the importance of discussing medications with  his or her physician or clinic they will be following up with. Patient alert and oriented and in no acute distress.   Patient discharged home ambulatory with self

## 2020-07-20 NOTE — DISCHARGE INSTRUCTIONS
Patient Education        Urethritis: Care Instructions  Your Care Instructions     Urethritis is an infection of the tube that takes urine from the bladder to the outside of the body. This tube is called the urethra. The infection is often caused by bacteria. This can happen if you have a sexually transmitted infection (STI). But a virus may also be a cause. Urethritis is usually treated with antibiotics. Most cases clear up with treatment. Proper treatment is very important. If you don't treat it, the infection can lead to lasting damage of the urethra. Other parts of the urinary system can also be damaged. Follow-up care is a key part of your treatment and safety. Be sure to make and go to all appointments, and call your doctor if you are having problems. It's also a good idea to know your test results and keep a list of the medicines you take. How can you care for yourself at home? · If your doctor prescribed antibiotics, take them as directed. Do not stop taking them just because you feel better. You need to take the full course of antibiotics. · Take an over-the-counter pain medicine, such as acetaminophen (Tylenol), ibuprofen (Advil, Motrin), or naproxen (Aleve), if needed. Be safe with medicines. Read and follow all instructions on the label. · Do not take two or more pain medicines at the same time unless the doctor told you to. Many pain medicines have acetaminophen, which is Tylenol. Too much acetaminophen (Tylenol) can be harmful. · Your doctor may have you take phenazopyridine (Pyridium). This is a pain medicine for the urinary tract. It can turn your urine a deep red-orange. This is normal. Call your doctor if you think you are having a problem with your medicine. · Do not have sex until you are done with treatment. If you do have sex, be sure to use a condom. Your sex partner or partners should be tested too if your urethritis was caused by an STI.   · If your infection was caused by an injury or chemicals, avoid those things if you can. When should you call for help? Call your doctor now or seek immediate medical care if:  · You can't urinate. · You have symptoms of a urinary infection. For example:  ? You have blood or pus in your urine. ? You have pain in your back just below your rib cage. This is called flank pain. ? You have a fever, chills, or body aches. ? It hurts to urinate. ? You have groin or belly pain. · You have a hard time urinating when your bladder is full. · You notice mental changes or feel confused. Watch closely for changes in your health, and be sure to contact your doctor if:  · You do not get better as expected. Where can you learn more? Go to http://www.gray.com/  Enter O355 in the search box to learn more about \"Urethritis: Care Instructions. \"  Current as of: August 22, 2019               Content Version: 12.5  © 0430-2527 Healthwise, Incorporated. Care instructions adapted under license by "[employer]" (which disclaims liability or warranty for this information). If you have questions about a medical condition or this instruction, always ask your healthcare professional. [address] any warranty or liability for your use of this information.

## 2020-07-20 NOTE — LETTER
7/22/2020 Marizol Able 
[address] [place] [phone] [address] Dear Mr. Leanne Boateng You were seen in the Emergency Department of [address] [place] [address] on 7/20/20 and had lab radiology tests performed. The chlamydia from your Emergency Department visit on 7/20/20 was positive. You were treated appropriately during your visit. No further treatment is required. Your partner needs to be treated. If you have any questions please contact the Emergency Department at [phone]. Sincerely, Nery Carranza PA-C 
 
[facility] EMERGENCY DEPT 
[address] [place] [id number] [phone]

## 2020-07-20 NOTE — ED NOTES
Pt here for evaluation of STI. According to pt his girl friend sts she was dx with STI. Pt denies any problems with urination. Pt is A+Ox3 clear speaking. Emergency Department Nursing Plan of Care       The Nursing Plan of Care is developed from the Nursing assessment and Emergency Department Attending provider initial evaluation. The plan of care may be reviewed in the ED Provider note.     The Plan of Care was developed with the following considerations:   Patient / Family readiness to learn indicated by:verbalized understanding  Persons(s) to be included in education: patient  Barriers to Learning/Limitations:No    Signed     Kimber Thompson RN    7/20/2020   3:22 PM

## 2020-07-20 NOTE — ED PROVIDER NOTES
EMERGENCY DEPARTMENT HISTORY AND PHYSICAL EXAM    Date: 7/20/2020  Patient Name: Umberto Pryor    History of Presenting Illness     Chief Complaint   Patient presents with    Exposure to STD         History Provided By: Patient      HPI: Umberto Pryor is a 36 y.o. male with a PMH of No significant past medical history who presents with STD exposure. Pt reports being informed by sexual partner that she has chlamydia. Denies penile or urinary changes. Reports receiving oral sex unprotected from another female. Denies hx of STD     PCP: None    Current Outpatient Medications   Medication Sig Dispense Refill    cephALEXin (Keflex) 500 mg capsule Take 1 Cap by mouth two (2) times a day for 7 days. 14 Cap 0       Past History     Past Medical History:  History reviewed. No pertinent past medical history. Past Surgical History:  History reviewed. No pertinent surgical history. Family History:  History reviewed. No pertinent family history. Social History:  Social History     Tobacco Use    Smoking status: Current Every Day Smoker     Packs/day: 0.50    Smokeless tobacco: Never Used   Substance Use Topics    Alcohol use: Yes    Drug use: No       Allergies:  No Known Allergies      Review of Systems   Review of Systems   Constitutional: Negative for chills, diaphoresis, fatigue and fever. HENT: Negative for sore throat. Respiratory: Negative for cough and shortness of breath. Cardiovascular: Negative for chest pain and leg swelling. Gastrointestinal: Negative for abdominal pain, nausea and vomiting. Genitourinary: Negative for discharge, dysuria, frequency, genital sores, hematuria, penile pain, penile swelling, scrotal swelling, testicular pain and urgency. Musculoskeletal: Negative for arthralgias and back pain. Skin: Negative for rash. Neurological: Negative for dizziness, numbness and headaches. All other systems reviewed and are negative.       Physical Exam     Vitals:    07/20/20 1504 07/20/20 1522 07/20/20 1647   BP: 144/83     Pulse: 76     Resp: 16     Temp: 97.9 °F (36.6 °C)     SpO2: 98% 98% 98%   Weight: 78 kg (172 lb)     Height: 5' 7\" (1.702 m)       Physical Exam  Vitals signs and nursing note reviewed. Constitutional:       General: He is not in acute distress. Appearance: He is well-developed. He is not ill-appearing. HENT:      Head: Normocephalic and atraumatic. Right Ear: Tympanic membrane, ear canal and external ear normal.      Left Ear: Tympanic membrane, ear canal and external ear normal.      Nose: Nose normal.      Mouth/Throat:      Mouth: Mucous membranes are moist.      Pharynx: No oropharyngeal exudate. Eyes:      Extraocular Movements: Extraocular movements intact. Conjunctiva/sclera: Conjunctivae normal.      Pupils: Pupils are equal, round, and reactive to light. Neck:      Musculoskeletal: Normal range of motion and neck supple. Cardiovascular:      Rate and Rhythm: Normal rate and regular rhythm. Pulses: Normal pulses. Heart sounds: Normal heart sounds. Pulmonary:      Effort: Pulmonary effort is normal.      Breath sounds: Normal breath sounds. Abdominal:      General: Bowel sounds are normal. There is no distension. Palpations: Abdomen is soft. Tenderness: There is no abdominal tenderness. There is no guarding or rebound. Genitourinary:     Comments:  deferred   Lymphadenopathy:      Cervical: No cervical adenopathy. Skin:     General: Skin is warm and dry. Neurological:      Mental Status: He is alert and oriented to person, place, and time. GCS: GCS eye subscore is 4. GCS verbal subscore is 5. GCS motor subscore is 6. Cranial Nerves: No cranial nerve deficit. Psychiatric:         Thought Content:  Thought content normal.           Diagnostic Study Results     Labs -     Recent Results (from the past 12 hour(s))   URINALYSIS W/ REFLEX CULTURE    Collection Time: 07/20/20  3:42 PM    Specimen: Miscellaneous sample; Urine    Urine specimen   Result Value Ref Range    Color DARK YELLOW      Appearance CLEAR CLEAR      Specific gravity 1.020 1.003 - 1.030      pH (UA) 6.5 5.0 - 8.0      Protein 30 (A) NEG mg/dL    Glucose Negative NEG mg/dL    Ketone TRACE (A) NEG mg/dL    Blood Negative NEG      Urobilinogen 1.0 0.2 - 1.0 EU/dL    Nitrites Negative NEG      Leukocyte Esterase MODERATE (A) NEG      WBC 20-50 0 - 4 /hpf    RBC 0-5 0 - 5 /hpf    Epithelial cells FEW FEW /lpf    Bacteria Negative NEG /hpf    UA:UC IF INDICATED URINE CULTURE ORDERED (A) CNI     BILIRUBIN, CONFIRM    Collection Time: 07/20/20  3:42 PM   Result Value Ref Range    Bilirubin UA, confirm Negative NEG         Radiologic Studies -   No orders to display     CT Results  (Last 48 hours)    None        CXR Results  (Last 48 hours)    None            Medical Decision Making   I am the first provider for this patient. I reviewed the vital signs, available nursing notes, past medical history, past surgical history, family history and social history. Vital Signs-Reviewed the patient's vital signs. Records Reviewed: Nursing Notes and Old Medical Records     33yo M with STD exposure. Empiric treatment provided. Advised to notify partner of STD testing and treatment. Recommend to avoid sex for 1 week. Advised he will be notified in 3-4 days if sx are positive. ED Course as of Jul 20 1649   Mon Jul 20, 2020   1543 Awaiting urine results at this time      [NA]      ED Course User Index  [NA] Cele March NP          Disposition:  Discharge     DISCHARGE NOTE:       Care plan outlined and precautions discussed. Patient has no new complaints, changes, or physical findings. All of pt's questions and concerns were addressed. Patient was instructed and agrees to follow up with health department, as well as to return to the ED upon further deterioration. Patient is ready to go home.     Follow-up Information     Follow up With Specialties Details Why [address]   As needed, If symptoms worsen [address] [id number] [phone]          Discharge Medication List as of 7/20/2020  4:29 PM      START taking these medications    Details   cephALEXin (Keflex) 500 mg capsule Take 1 Cap by mouth two (2) times a day for 7 days. , Normal, Disp-14 Cap,R-0         STOP taking these medications       ibuprofen (MOTRIN) 800 mg tablet Comments:   Reason for Stopping:               Provider Notes (Medical Decision Making):   DDX: Chlamydia, Gonorrhea, urethritis, Candidiasis, Trichomonas, UTI    Procedures:  Procedures    Please note that this dictation was completed with Dragon, computer voice recognition software. Quite often unanticipated grammatical, syntax, homophones, and other interpretive errors are inadvertently transcribed by the computer software. Please disregard these errors. Additionally, please excuse any errors that have escaped final proofreading. Diagnosis     Clinical Impression:   1.  Urethritis

## 2020-07-21 LAB
BACTERIA SPEC CULT: NORMAL
SERVICE CMNT-IMP: NORMAL

## 2020-07-22 LAB
C TRACH DNA SPEC QL NAA+PROBE: POSITIVE
N GONORRHOEA DNA SPEC QL NAA+PROBE: NEGATIVE
SAMPLE TYPE: ABNORMAL
SERVICE CMNT-IMP: ABNORMAL
SPECIMEN SOURCE: ABNORMAL

## 2020-07-30 ENCOUNTER — HOSPITAL ENCOUNTER (EMERGENCY)
Age: 35
Discharge: HOME | End: 2020-07-30
Attending: EMERGENCY MEDICINE | Admitting: EMERGENCY MEDICINE
Payer: COMMERCIAL

## 2020-07-30 VITALS
WEIGHT: 172 LBS | RESPIRATION RATE: 18 BRPM | SYSTOLIC BLOOD PRESSURE: 137 MMHG | DIASTOLIC BLOOD PRESSURE: 85 MMHG | HEART RATE: 83 BPM | HEIGHT: 67 IN | TEMPERATURE: 97.8 F | BODY MASS INDEX: 27 KG/M2 | OXYGEN SATURATION: 99 %

## 2020-07-30 DIAGNOSIS — M54.10 RADICULOPATHY AFFECTING UPPER EXTREMITY: ICD-10-CM

## 2020-07-30 DIAGNOSIS — S46.812A TRAPEZIUS MUSCLE STRAIN, LEFT, INITIAL ENCOUNTER: Primary | ICD-10-CM

## 2020-07-30 PROCEDURE — 99282 EMERGENCY DEPT VISIT SF MDM: CPT

## 2020-07-30 RX ORDER — CYCLOBENZAPRINE HCL 10 MG
10 TABLET ORAL
Qty: 15 TAB | Refills: 0 | Status: SHIPPED | OUTPATIENT
Start: 2020-07-30

## 2020-07-30 RX ORDER — PREDNISONE 5 MG/1
TABLET ORAL
Qty: 21 TAB | Refills: 0 | Status: SHIPPED | OUTPATIENT
Start: 2020-07-30

## 2020-07-30 NOTE — DISCHARGE INSTRUCTIONS
Patient Education        Back Pain: Care Instructions  Your Care Instructions     Back pain has many possible causes. It is often related to problems with muscles and ligaments of the back. It may also be related to problems with the nerves, discs, or bones of the back. Moving, lifting, standing, sitting, or sleeping in an awkward way can strain the back. Sometimes you don't notice the injury until later. Arthritis is another common cause of back pain. Although it may hurt a lot, back pain usually improves on its own within several weeks. Most people recover in 12 weeks or less. Using good home treatment and being careful not to stress your back can help you feel better sooner. Follow-up care is a key part of your treatment and safety. Be sure to make and go to all appointments, and call your doctor if you are having problems. It's also a good idea to know your test results and keep a list of the medicines you take. How can you care for yourself at home? · Sit or lie in positions that are most comfortable and reduce your pain. Try one of these positions when you lie down:  ? Lie on your back with your knees bent and supported by large pillows. ? Lie on the floor with your legs on the seat of a sofa or chair. ? Lie on your side with your knees and hips bent and a pillow between your legs. ? Lie on your stomach if it does not make pain worse. · Do not sit up in bed, and avoid soft couches and twisted positions. Bed rest can help relieve pain at first, but it delays healing. Avoid bed rest after the first day of back pain. · Change positions every 30 minutes. If you must sit for long periods of time, take breaks from sitting. Get up and walk around, or lie in a comfortable position. · Try using a heating pad on a low or medium setting for 15 to 20 minutes every 2 or 3 hours. Try a warm shower in place of one session with the heating pad. · You can also try an ice pack for 10 to 15 minutes every 2 to 3 hours. Put a thin cloth between the ice pack and your skin. · Take pain medicines exactly as directed. ? If the doctor gave you a prescription medicine for pain, take it as prescribed. ? If you are not taking a prescription pain medicine, ask your doctor if you can take an over-the-counter medicine. · Take short walks several times a day. You can start with 5 to 10 minutes, 3 or 4 times a day, and work up to longer walks. Walk on level surfaces and avoid hills and stairs until your back is better. · Return to work and other activities as soon as you can. Continued rest without activity is usually not good for your back. · To prevent future back pain, do exercises to stretch and strengthen your back and stomach. Learn how to use good posture, safe lifting techniques, and proper body mechanics. When should you call for help? Call your doctor now or seek immediate medical care if:  · You have new or worsening numbness in your legs. · You have new or worsening weakness in your legs. (This could make it hard to stand up.)  · You lose control of your bladder or bowels. Watch closely for changes in your health, and be sure to contact your doctor if:  · You have a fever, lose weight, or don't feel well. · You do not get better as expected. Where can you learn more? Go to http://corey-annie.info/  Enter I594 in the search box to learn more about \"Back Pain: Care Instructions. \"  Current as of: March 2, 2020               Content Version: 12.5  © 2789-8285 Healthwise, Incorporated. Care instructions adapted under license by [employer] (which disclaims liability or warranty for this information). If you have questions about a medical condition or this instruction, always ask your healthcare professional. [place] [address] any warranty or liability for your use of this information.          Patient Education        Pinched Nerve in the Neck: Care Instructions  Your Care Instructions  A pinched nerve in the neck happens when a vertebra or disc in the upper part of your spine is damaged. This damage can happen because of an injury. Or it can just happen with age. The changes caused by the damage may put pressure on a nearby nerve root, pinching it. This causes symptoms such as sharp pain in your neck, shoulder, arm, hand, or back. You may also have tingling or numbness. Sometimes it makes your arm weaker. The symptoms are usually worse when you turn your head or strain your neck. For many people, the symptoms get better over time and finally go away. Early treatment usually includes medicines for pain and swelling. Sometimes physical therapy and special exercises may help. Follow-up care is a key part of your treatment and safety. Be sure to make and go to all appointments, and call your doctor if you are having problems. It's also a good idea to know your test results and keep a list of the medicines you take. How can you care for yourself at home? · Be safe with medicines. Read and follow all instructions on the label. ¨ If the doctor gave you a prescription medicine for pain, take it as prescribed. ¨ If you are not taking a prescription pain medicine, ask your doctor if you can take an over-the-counter medicine. · Try using a heating pad on a low or medium setting for 15 to 20 minutes every 2 or 3 hours. Try a warm shower in place of one session with the heating pad. You can also buy single-use heat wraps that last up to 8 hours. · You can also try an ice pack for 10 to 15 minutes every 2 to 3 hours. There isn't strong evidence that either heat or ice will help. But you can try them to see if they help you. · Don't spend too long in one position. Take short breaks to move around and change positions. · Wear a seat belt and shoulder harness when you are in a car. · Sleep with a pillow under your head and neck that keeps your neck straight.   · If you were given a neck brace (cervical collar) to limit neck motion, wear it as instructed for as many days as your doctor tells you to. Do not wear it longer than you were told to. Wearing a brace for too long can lead to neck stiffness and can weaken the neck muscles. · Follow your doctor's instructions for gentle neck-stretching exercises. · Do not smoke. Smoking can slow healing of your discs. If you need help quitting, talk to your doctor about stop-smoking programs and medicines. These can increase your chances of quitting for good. · Avoid strenuous work or exercise until your doctor says it is okay. When should you call for help? Call 911 anytime you think you may need emergency care. For example, call if:  ? · You are unable to move an arm or a leg at all. ?Call your doctor now or seek immediate medical care if:  ? · You have new or worse symptoms in your arms, legs, chest, belly, or buttocks. Symptoms may include:  ¨ Numbness or tingling. ¨ Weakness. ¨ Pain. ? · You lose bladder or bowel control. ? Watch closely for changes in your health, and be sure to contact your doctor if:  ? · You are not getting better as expected. Where can you learn more? Go to http://www.gray.com/. Enter M647 in the search box to learn more about \"Pinched Nerve in the Neck: Care Instructions. \"  Current as of: March 21, 2017  Content Version: 11.5  © 6319-2671 [employer]. Care instructions adapted under license by [employer] (which disclaims liability or warranty for this information). If you have questions about a medical condition or this instruction, always ask your healthcare professional. [place] [address] any warranty or liability for your use of this information. Patient Education        Healthy Upper Back: Exercises  Introduction  Here are some examples of exercises for your upper back. Start each exercise slowly.  Ease off the exercise if you start to have pain.  Your doctor or physical therapist will tell you when you can start these exercises and which ones will work best for you. How to do the exercises  Lower neck and upper back stretch   1. Stretch your arms out in front of your body. Clasp one hand on top of your other hand. 2. Gently reach out so that you feel your shoulder blades stretching away from each other. 3. Gently bend your head forward. 4. Hold for 15 to 30 seconds. 5. Repeat 2 to 4 times. Midback stretch   If you have knee pain, do not do this exercise. 1. Kneel on the floor, and sit back on your ankles. 2. Lean forward, place your hands on the floor, and stretch your arms out in front of you. Rest your head between your arms. 3. Gently push your chest toward the floor, reaching as far in front of you as possible. 4. Hold for 15 to 30 seconds. 5. Repeat 2 to 4 times. Shoulder rolls   1. Sit comfortably with your feet shoulder-width apart. You can also do this exercise while standing. 2. Roll your shoulders up, then back, and then down in a smooth, circular motion. 3. Repeat 2 to 4 times. Wall push-up   1. Stand against a wall with your feet about 12 to 24 inches back from the wall. If you feel any pain when you do this exercise, stand closer to the wall. 2. Place your hands on the wall slightly wider apart than your shoulders, and lean forward. 3. Gently lean your body toward the wall. Then push back to your starting position. Keep the motion smooth and controlled. 4. Repeat 8 to 12 times. Resisted shoulder blade squeeze   For this exercise, you will need elastic exercise material, such as surgical tubing or Thera-Band. 1. Sit or stand, holding the band in both hands in front of you. Keep your elbows close to your sides, bent at a 90-degree angle. Your palms should face up. 2. Squeeze your shoulder blades together, and move your arms to the outside, stretching the band.  Be sure to keep your elbows at your sides while you do this. 3. Relax. 4. Repeat 8 to 12 times. Resisted rows   For this exercise, you will need elastic exercise material, such as surgical tubing or Thera-Band. 1. Put the band around a solid object, such as a bedpost, at about waist level. Hold one end of the band in each hand. 2. With your elbows at your sides and bent to 90 degrees, pull the band back to move your shoulder blades toward each other. Return to the starting position. 3. Repeat 8 to 12 times. Follow-up care is a key part of your treatment and safety. Be sure to make and go to all appointments, and call your doctor if you are having problems. It's also a good idea to know your test results and keep a list of the medicines you take. Where can you learn more? Go to http://corey-annie.info/  Enter C690 in the search box to learn more about \"Healthy Upper Back: Exercises. \"  Current as of: March 2, 2020               Content Version: 12.5  © 2006-2020 Healthwise, Incorporated. Care instructions adapted under license by [employer] (which disclaims liability or warranty for this information). If you have questions about a medical condition or this instruction, always ask your healthcare professional. [address] any warranty or liability for your use of this information.

## 2020-07-30 NOTE — ED NOTES
Discharge instructions were given to the patient by Dara Burdick RN. The patient left the Emergency Department ambulatory, alert and oriented and in no acute distress with 2 prescriptions. The patient was encouraged to call or return to the ED for worsening issues or problems and was encouraged to schedule a follow up appointment for continuing care. Pt also left with a work note. The patient verbalized understanding of discharge instructions and prescriptions, all questions were answered. The patient has no further concerns at this time.

## 2020-07-30 NOTE — ED NOTES
Prior to discharge, patient inquired about STD test results collected 7/20. Patient reports he was treated at that time and he came back positive for chlamydia. Patient was advised of results.

## 2020-07-30 NOTE — LETTER
[facility] EMERGENCY DEPT 
[address] [address] 
[phone] Work/School Note Date: 7/30/2020 To Whom It May concern: 
 
Taina Mathias was seen and treated today in the emergency room by the following provider(s): 
Attending Provider: Leonard Rey MD 
Physician Assistant: Krystle Johnson PA-C. Taina Mathias may return to work on 8/3/20. Sincerely, Robert Will PA-C

## 2020-07-31 NOTE — ED PROVIDER NOTES
EMERGENCY DEPARTMENT HISTORY AND PHYSICAL EXAM    Date: 7/30/2020  Patient Name: Corby Thayer    History of Presenting Illness     Chief Complaint   Patient presents with    Back Pain         History Provided By: Patient    HPI: Corby Thayer is a 36 y.o. male with No significant past medical history who presents with left arm numbness and left upper back pain x10 days. Patient states he was seen about a week ago for STI symptoms and received an injection for empiric treatment. Patient states the next day he woke up and his arm was numb and he was having back pain by the shoulder blade. Patient denies any injury or trauma. 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 PCP: None    Current Outpatient Medications   Medication Sig Dispense Refill    predniSONE (STERAPRED) 5 mg dose pack See administration instruction per 5mg dose pack 21 Tab 0    cyclobenzaprine (FLEXERIL) 10 mg tablet Take 1 Tab by mouth three (3) times daily as needed for Muscle Spasm(s). 15 Tab 0       Past History     Past Medical History:  History reviewed. No pertinent past medical history. Past Surgical History:  History reviewed. No pertinent surgical history. Family History:  History reviewed. No pertinent family history. Social History:  Social History     Tobacco Use    Smoking status: Current Every Day Smoker     Packs/day: 0.50    Smokeless tobacco: Never Used   Substance Use Topics    Alcohol use: Yes    Drug use: No       Allergies:  No Known Allergies      Review of Systems   Review of Systems   Constitutional: Negative for chills and fever. Musculoskeletal: Positive for back pain and myalgias. Neurological: Positive for numbness. Negative for dizziness, speech difficulty and weakness. All other systems reviewed and are negative.       Physical Exam     Vitals:    07/30/20 1806   BP: 137/85   Pulse: 83   Resp: 18   Temp: 97.8 °F (36.6 °C)   SpO2: 99%   Weight: 78 kg (172 lb)   Height: 5' 7\" (1.702 m)     Physical Exam  Vitals signs and nursing note reviewed. Constitutional:       General: He is not in acute distress. Appearance: He is well-developed. HENT:      Head: Normocephalic and atraumatic. Mouth/Throat:      Pharynx: No oropharyngeal exudate. Eyes:      Conjunctiva/sclera: Conjunctivae normal.   Cardiovascular:      Rate and Rhythm: Normal rate and regular rhythm. Pulses:           Radial pulses are 2+ on the right side and 2+ on the left side. Heart sounds: Normal heart sounds. Pulmonary:      Effort: Pulmonary effort is normal. No respiratory distress. Breath sounds: Normal breath sounds. No wheezing or rales. Musculoskeletal: Normal range of motion. Thoracic back: He exhibits tenderness (along scapular border) and pain. He exhibits normal range of motion, no bony tenderness and no deformity. Back:    Skin:     General: Skin is warm and dry. Neurological:      Mental Status: He is alert and oriented to person, place, and time. Diagnostic Study Results     Labs -   No results found for this or any previous visit (from the past 12 hour(s)). Radiologic Studies -   No orders to display     CT Results  (Last 48 hours)    None        CXR Results  (Last 48 hours)    None            Medical Decision Making   I am the first provider for this patient. I reviewed the vital signs, available nursing notes, past medical history, past surgical history, family history and social history. Vital Signs-Reviewed the patient's vital signs. Records Reviewed: Old Medical Records and Previous Laboratory Studies    Disposition:  Discharged    DISCHARGE NOTE:   6:36 PM      Care plan outlined and precautions discussed. Patient has no new complaints, changes, or physical findings. All medications were reviewed with the patient; will d/c home.  All of pt's questions and concerns were addressed. Patient was instructed and agrees to follow up with PCP, as well as to return to the ED upon further deterioration. Patient is ready to go home. Follow-up Information     Follow up With Specialties Details Why [address] Internal Medicine Schedule an appointment as soon as possible for a visit in 1 week As needed for referral to PT Tala Trujillo  [phone]          Discharge Medication List as of 7/30/2020  6:36 PM      START taking these medications    Details   predniSONE (STERAPRED) 5 mg dose pack See administration instruction per 5mg dose pack, Normal, Disp-21 Tab,R-0      cyclobenzaprine (FLEXERIL) 10 mg tablet Take 1 Tab by mouth three (3) times daily as needed for Muscle Spasm(s). , Normal, Disp-15 Tab,R-0             Provider Notes (Medical Decision Making):   Pt presents with Left upper back pain and numbness down the Left arm. DDX: strain, sprain, mm spasm, radiculopathy. No need for imaging without injury or trauma. Procedures:  Procedures    Please note that this dictation was completed with Dragon, computer voice recognition software. Quite often unanticipated grammatical, syntax, homophones, and other interpretive errors are inadvertently transcribed by the computer software. Please disregard these errors. Additionally, please excuse any errors that have escaped final proofreading. Diagnosis     Clinical Impression:   1. Trapezius muscle strain, left, initial encounter    2.  Radiculopathy affecting upper extremity

## 2020-09-04 ENCOUNTER — HOSPITAL ENCOUNTER (EMERGENCY)
Age: 35
Discharge: HOME | End: 2020-09-04
Attending: EMERGENCY MEDICINE | Admitting: EMERGENCY MEDICINE
Payer: COMMERCIAL

## 2020-09-04 VITALS
HEIGHT: 67 IN | SYSTOLIC BLOOD PRESSURE: 125 MMHG | WEIGHT: 170 LBS | DIASTOLIC BLOOD PRESSURE: 92 MMHG | TEMPERATURE: 97.8 F | BODY MASS INDEX: 26.68 KG/M2 | OXYGEN SATURATION: 100 % | RESPIRATION RATE: 14 BRPM | HEART RATE: 84 BPM

## 2020-09-04 DIAGNOSIS — Z20.2 STD EXPOSURE: Primary | ICD-10-CM

## 2020-09-04 LAB
APPEARANCE UR: CLEAR
BACTERIA URNS QL MICRO: NEGATIVE /HPF
BILIRUB UR QL: NEGATIVE
C TRACH DNA SPEC QL NAA+PROBE: NEGATIVE
COLOR UR: ABNORMAL
EPITH CASTS URNS QL MICRO: ABNORMAL /LPF
GLUCOSE UR STRIP.AUTO-MCNC: NEGATIVE MG/DL
HGB UR QL STRIP: NEGATIVE
KETONES UR QL STRIP.AUTO: ABNORMAL MG/DL
LEUKOCYTE ESTERASE UR QL STRIP.AUTO: ABNORMAL
MUCOUS THREADS URNS QL MICRO: ABNORMAL /LPF
N GONORRHOEA DNA SPEC QL NAA+PROBE: NEGATIVE
NITRITE UR QL STRIP.AUTO: NEGATIVE
PH UR STRIP: 6.5 [PH] (ref 5–8)
PROT UR STRIP-MCNC: NEGATIVE MG/DL
RBC #/AREA URNS HPF: ABNORMAL /HPF (ref 0–5)
SAMPLE TYPE: NORMAL
SERVICE CMNT-IMP: NORMAL
SP GR UR REFRACTOMETRY: 1.02 (ref 1–1.03)
SPECIMEN SOURCE: NORMAL
UROBILINOGEN UR QL STRIP.AUTO: 0.2 EU/DL (ref 0.2–1)
WBC URNS QL MICRO: ABNORMAL /HPF (ref 0–4)

## 2020-09-04 PROCEDURE — 96372 THER/PROPH/DIAG INJ SC/IM: CPT

## 2020-09-04 PROCEDURE — 74011250636 HC RX REV CODE- 250/636: Performed by: EMERGENCY MEDICINE

## 2020-09-04 PROCEDURE — 99283 EMERGENCY DEPT VISIT LOW MDM: CPT

## 2020-09-04 PROCEDURE — 81001 URINALYSIS AUTO W/SCOPE: CPT

## 2020-09-04 PROCEDURE — 74011250637 HC RX REV CODE- 250/637: Performed by: EMERGENCY MEDICINE

## 2020-09-04 PROCEDURE — 74011000250 HC RX REV CODE- 250: Performed by: EMERGENCY MEDICINE

## 2020-09-04 PROCEDURE — 87491 CHLMYD TRACH DNA AMP PROBE: CPT

## 2020-09-04 RX ORDER — AZITHROMYCIN 500 MG/1
1000 TABLET, FILM COATED ORAL
Status: COMPLETED | OUTPATIENT
Start: 2020-09-04 | End: 2020-09-04

## 2020-09-04 RX ADMIN — AZITHROMYCIN MONOHYDRATE 1000 MG: 500 TABLET ORAL at 02:18

## 2020-09-04 RX ADMIN — LIDOCAINE HYDROCHLORIDE 250 MG: 10 INJECTION, SOLUTION EPIDURAL; INFILTRATION; INTRACAUDAL; PERINEURAL at 02:18

## 2020-09-04 NOTE — ED PROVIDER NOTES
29-year-old male presents with concern for recurrent chlamydia. Seen and treated here a month ago for chlamydia after his girlfriend tested positive he did not wait the full 2 weeks and they had sex again and now the girlfriend has the same funny smelling vaginal discharge. He is asking to be treated and tested again. Denies dysuria, penile discharge, pelvic pain, back pain. Past Medical History:   Diagnosis Date    Chlamydia        History reviewed. No pertinent surgical history. History reviewed. No pertinent family history. Social History     Socioeconomic History    Marital status: SINGLE     Spouse name: Not on file    Number of children: Not on file    Years of education: Not on file    Highest education level: Not on file   Occupational History    Not on file   Social Needs    Financial resource strain: Not on file    Food insecurity     Worry: Not on file     Inability: Not on file    Transportation needs     Medical: Not on file     Non-medical: Not on file   Tobacco Use    Smoking status: Current Every Day Smoker     Packs/day: 0.50    Smokeless tobacco: Never Used   Substance and Sexual Activity    Alcohol use:  Yes    Drug use: No    Sexual activity: Yes     Partners: Female     Birth control/protection: None   Lifestyle    Physical activity     Days per week: Not on file     Minutes per session: Not on file    Stress: Not on file   Relationships    Social connections     Talks on phone: Not on file     Gets together: Not on file     Attends Taoism service: Not on file     Active member of club or organization: Not on file     Attends meetings of clubs or organizations: Not on file     Relationship status: Not on file    Intimate partner violence     Fear of current or ex partner: Not on file     Emotionally abused: Not on file     Physically abused: Not on file     Forced sexual activity: Not on file   Other Topics Concern    Not on file   Social History Narrative    Not on file         ALLERGIES: Patient has no known allergies. Review of Systems   Constitutional: Negative. Negative for fever. HENT: Negative. Negative for drooling, facial swelling and trouble swallowing. Eyes: Negative. Negative for discharge and redness. Respiratory: Negative. Negative for chest tightness, shortness of breath and wheezing. Cardiovascular: Negative. Negative for chest pain. Gastrointestinal: Negative. Negative for abdominal distention, abdominal pain, constipation, diarrhea, nausea and vomiting. Endocrine: Negative. Genitourinary: Negative. Negative for difficulty urinating and dysuria. Musculoskeletal: Negative. Negative for arthralgias and myalgias. Skin: Negative. Negative for color change and rash. Allergic/Immunologic: Negative. Neurological: Negative. Negative for syncope, facial asymmetry and speech difficulty. Hematological: Negative. Psychiatric/Behavioral: Negative. Negative for agitation and confusion. All other systems reviewed and are negative. Vitals:    09/04/20 0145 09/04/20 0212   BP: (!) 125/92    Pulse: 84    Resp: 14    Temp: 97.8 °F (36.6 °C)    SpO2: 100% 100%   Weight: 77.1 kg (170 lb)    Height: 5' 7\" (1.702 m)             Physical Exam  Vitals signs and nursing note reviewed. Constitutional:       Appearance: Normal appearance. He is well-developed. HENT:      Head: Normocephalic and atraumatic. Eyes:      Conjunctiva/sclera: Conjunctivae normal.   Neck:      Musculoskeletal: Neck supple. Cardiovascular:      Rate and Rhythm: Normal rate and regular rhythm. Pulmonary:      Effort: No accessory muscle usage or respiratory distress. Abdominal:      Palpations: Abdomen is soft. Tenderness: There is no abdominal tenderness. Musculoskeletal: Normal range of motion. Skin:     General: Skin is warm and dry.    Neurological:      Mental Status: He is alert and oriented to person, place, and time.   Psychiatric:         Behavior: Behavior normal.         Thought Content: Thought content normal.          MDM  Number of Diagnoses or Management Options  STD exposure:          Procedures      LABORATORY TESTS:  Recent Results (from the past 12 hour(s))   URINALYSIS W/ RFLX MICROSCOPIC    Collection Time: 09/04/20  2:06 AM   Result Value Ref Range    Color YELLOW/STRAW      Appearance CLEAR CLEAR      Specific gravity 1.025 1.003 - 1.030      pH (UA) 6.5 5.0 - 8.0      Protein Negative NEG mg/dL    Glucose Negative NEG mg/dL    Ketone TRACE (A) NEG mg/dL    Bilirubin Negative NEG      Blood Negative NEG      Urobilinogen 0.2 0.2 - 1.0 EU/dL    Nitrites Negative NEG      Leukocyte Esterase SMALL (A) NEG     URINE MICROSCOPIC ONLY    Collection Time: 09/04/20  2:06 AM   Result Value Ref Range    WBC 0-4 0 - 4 /hpf    RBC 0-5 0 - 5 /hpf    Epithelial cells FEW FEW /lpf    Bacteria Negative NEG /hpf    Mucus 2+ (A) NEG /lpf       IMAGING RESULTS:  No orders to display       MEDICATIONS GIVEN:  Medications   cefTRIAXone (ROCEPHIN) 250 mg in lidocaine (PF) (XYLOCAINE) 10 mg/mL (1 %) IM injection (250 mg IntraMUSCular Given 9/4/20 0218)   azithromycin (ZITHROMAX) tablet 1,000 mg (1,000 mg Oral Given 9/4/20 0218)       IMPRESSION:  1. STD exposure        PLAN:  1. Discharge Medication List as of 9/4/2020  2:29 AM        2.    Follow-up Information     Follow up With Specialties Details Why [facility]  Schedule an appointment as soon as possible for a visit  [address] [phone]        Return to ED if worse

## 2020-09-04 NOTE — DISCHARGE INSTRUCTIONS
Patient [address] Departments     For adult and child immunizations, family planning, TB screening, STD testing and women's health services. [facility]: [place] [phone]      [facility] 25   [address]   [address]   [address] [facility] [address]: Cj Nance [address] [facility] [address] [phone]      [address]          [address] [address]     For primary care services, woman and child wellness, and some clinics providing specialty care. [facility] -- [address] [facility] [address] 2525 [facility] [phone]   411 [facility] 200 [facility] 3614 [facility] [phone]   [address] [facility] [address] [phone] [address]  [address] [place] [address] [facility] [phone]   [address] [phone]   [facility] 81 [facility] [phone]   [address] [phone]   [facility]: [facility] 700 mylene Yung [address] [place] [address], [address] [phone]     [address] [phone]   [facility] [address] [facility] [phone]   [facility]  [address], [address] ([email or web address]) [phone]         Sexual Health/Woman Wellness Clinics    For STD/HIV testing and treatment, pregnancy testing and services, men's health, birth control services, LGBT services, and hepatitis/HPV vaccine services. [facility] [place] [employer] [address] [facility] [address] [facility] 1579 [address] [phone]   [facility] [address], 5th floor [phone]   Pregnancy [address] for Women 118 [address] Counter [phone] 9460 [facility] Blood [address] [facility] [phone] [address] [facility]   [phone]   [place]   [phone]   Women, Infant and Children's Services: [address] [phone] 600 [facility]   [phone]   [address]   [facility] Psychiatry     [phone]   [address] Crisis   [address] [phone]     Local Primary Care Physicians  [facility] [phone]  MD Cathleen Garcia MD Louellen Denier, MD [facility] Doctors [phone]  Therman Epley, [facility]  MD Daniela Colón MD Bonnita Peng, MD [address] [facility] [address] [phone]  MD Angel Dozier MD [address] [facility] [phone]  MD Avelino Goodson MD Darliss Mourning, MD Dorman Buddle, MD   [facility] [phone]  NATASHA MVHQSZ QX, MD Malinda Foss, MD Malia Brown, NP [address] [facility] [address] [phone]  MD Jonny Jean MD Rachell Pam, MD Cassell Mask, MD Danna Black MD Emma Rundle, MD   33 57 [facility]  Aryan Card MD [facility] [phone]  MD Brenda Tapia, MD Tim Horvath MD Damaris Berliner, MD Dorie Jerez MD   6814 [facility] Practice [phone]  MD Sarah Blackman, P  Shama Flaherty, MD Golden Prado MD Rolland Catena, MD Fernanda Pour, MD [facility] [phone]  Tad Pluck, MD  Ruthanna Jackson, MD Caroll Songster, MD Ygnacio Seip, MD Anniece Habermann, MD   [address] [phone]  MD Attila Lewis MD [facility] [phone]  MD S[facility] MD Jhonathan Aburto MD   1903 [facility] Physicians [phone]  Marlan Greulich, MD Herma Hoard, MD Karyle Bougie, MD Lawyer Old, MD Michaeleen Kidd, MD Merlinda Finlay, NUHA Moralez MD 1619  66   [phone]  MD Anjali Taylor MD Meg Bouche, MD   2102 [facility] [phone]  [address] [place] [address] MD Osmel Montague, [facility]  Radha Julien, PACOLBY  Llana Chamber, [facility]  MARILEE Camp MD Karlyne Conrad, NUHA   [facility],  Miscellaneous:  Regino Mensah -[phone]         Exposure to Sexually Transmitted Infections: Care Instructions  Your Care Instructions  Sexually transmitted infections (STIs) are those diseases spread by sexual contact. There are at least 20 different STIs, including chlamydia, gonorrhea, syphilis, and human immunodeficiency virus (HIV), which causes AIDS. Bacteria-caused STIs can be treated and cured. STIs caused by viruses, such as HIV, can be treated but not cured. Some STIs can reduce a woman's chances of getting pregnant in the future. STIs are spread during sexual contact, such as vaginal intercourse and oral or anal sex. Follow-up care is a key part of your treatment and safety. Be sure to make and go to all appointments, and call your doctor if you are having problems. It's also a good idea to know your test results and keep a list of the medicines you take. How can you care for yourself at home? · Your doctor may have given you a shot of antibiotics. If your doctor prescribed antibiotic pills, take them as directed. Do not stop taking them just because you feel better. You need to take the full course of antibiotics. · Do not have sexual contact while you have symptoms of an STI or are being treated for an STI. · Tell your sex partner (or partners) that he or she will need treatment.   · If you are a woman, do not douche. Douching changes the normal balance of bacteria in the vagina and may spread an infection up into your reproductive organs. To prevent exposure to STIs in the future  · Use latex condoms every time you have sex. Use them from the beginning to the end of sexual contact. · Talk to your partner before you have sex. Find out if he or she has or is at risk for any STI. Keep in mind that a person may be able to spread an STI even if he or she does not have symptoms. · Do not have sex if you are being treated for an STI. · Do not have sex with anyone who has symptoms of an STI, such as sores on the genitals or mouth. · Having one sex partner (who does not have STIs and does not have sex with anyone else) is a good way to avoid STIs. When should you call for help? Call your doctor now or seek immediate medical care if:    · You have new pain in your belly or pelvis.     · You have symptoms of a urinary tract infection. These may include:  ? Pain or burning when you urinate. ? A frequent need to urinate without being able to pass much urine. ? Pain in the flank, which is just below the rib cage and above the waist on either side of the back. ? Blood in your urine. ? A fever.     · You have new or worsening pain or swelling in the scrotum. Watch closely for changes in your health, and be sure to contact your doctor if:    · You have unusual vaginal bleeding.     · You have a discharge from the vagina or penis.     · You have any new symptoms, such as sores, bumps, rashes, blisters, or warts.     · You have itching, tingling, pain, or burning in the genital or anal area.     · You think you may have an STI. Where can you learn more? Go to http://corey-annie.info/  Enter M049 in the search box to learn more about \"Exposure to Sexually Transmitted Infections: Care Instructions. \"  Current as of: February 26, 2020               Content Version: 12.6  © 3640-6450 Healthwise, Incorporated. Care instructions adapted under license by [employer] (which disclaims liability or warranty for this information). If you have questions about a medical condition or this instruction, always ask your healthcare professional. [address] any warranty or liability for your use of this information.

## 2020-09-04 NOTE — ED NOTES
Pt arrived to ED via ambulatory with c/o chlamydia. Pt. States he did not wait the two weeks and is smelling a foul odor. Pt. Reports he was here x1 month ago and treated for chlaymydia. Pt. Denies penile discharge, lower abdominal pain, dysuria, urinary frequency/urinary urgency, nausea, vomiting, diarrhea, fever and chills. Lungs clear. Pt is in no acute distress. Will continue to monitor. See nursing assessment. Safety precautions in place; call light within reach. Emergency Department Nursing Plan of Care       The Nursing Plan of Care is developed from the Nursing assessment and Emergency Department Attending provider initial evaluation. The plan of care may be reviewed in the ED Provider note.     The Plan of Care was developed with the following considerations:   Patient / Family readiness to learn indicated by:verbalized understanding  Persons(s) to be included in education: patient  Barriers to Learning/Limitations:No    Signed     Leeroy Mckinley, RACEHL    9/4/2020   2:09 AM English

## 2023-07-31 ENCOUNTER — HOSPITAL ENCOUNTER (EMERGENCY)
Facility: HOSPITAL | Age: 38
Discharge: HOME | End: 2023-08-01
Attending: EMERGENCY MEDICINE
Payer: MEDICAID

## 2023-07-31 VITALS
HEIGHT: 70 IN | WEIGHT: 200 LBS | TEMPERATURE: 98.6 F | SYSTOLIC BLOOD PRESSURE: 135 MMHG | DIASTOLIC BLOOD PRESSURE: 85 MMHG | HEART RATE: 90 BPM | OXYGEN SATURATION: 99 % | BODY MASS INDEX: 28.63 KG/M2 | RESPIRATION RATE: 18 BRPM

## 2023-07-31 DIAGNOSIS — J02.9 SORE THROAT: ICD-10-CM

## 2023-07-31 DIAGNOSIS — Z72.0 TOBACCO ABUSE: ICD-10-CM

## 2023-07-31 DIAGNOSIS — J02.9 ACUTE PHARYNGITIS, UNSPECIFIED ETIOLOGY: Primary | ICD-10-CM

## 2023-07-31 LAB — DEPRECATED S PYO AG THROAT QL EIA: NEGATIVE

## 2023-07-31 PROCEDURE — 99283 EMERGENCY DEPT VISIT LOW MDM: CPT

## 2023-07-31 PROCEDURE — 87880 STREP A ASSAY W/OPTIC: CPT

## 2023-07-31 PROCEDURE — 6370000000 HC RX 637 (ALT 250 FOR IP): Performed by: EMERGENCY MEDICINE

## 2023-07-31 PROCEDURE — 87070 CULTURE OTHR SPECIMN AEROBIC: CPT

## 2023-07-31 PROCEDURE — 6360000002 HC RX W HCPCS: Performed by: EMERGENCY MEDICINE

## 2023-07-31 PROCEDURE — 87147 CULTURE TYPE IMMUNOLOGIC: CPT

## 2023-07-31 RX ORDER — AMOXICILLIN 250 MG/1
500 CAPSULE ORAL ONCE
Status: COMPLETED | OUTPATIENT
Start: 2023-07-31 | End: 2023-07-31

## 2023-07-31 RX ORDER — LIDOCAINE HYDROCHLORIDE 20 MG/ML
15 SOLUTION OROPHARYNGEAL
Status: COMPLETED | OUTPATIENT
Start: 2023-07-31 | End: 2023-07-31

## 2023-07-31 RX ORDER — NAPROXEN 250 MG/1
250 TABLET ORAL 2 TIMES DAILY WITH MEALS
Qty: 30 TABLET | Refills: 0 | Status: SHIPPED | OUTPATIENT
Start: 2023-07-31

## 2023-07-31 RX ORDER — AMOXICILLIN 500 MG/1
500 CAPSULE ORAL 2 TIMES DAILY
Qty: 20 CAPSULE | Refills: 0 | Status: SHIPPED | OUTPATIENT
Start: 2023-07-31 | End: 2023-08-10

## 2023-07-31 RX ORDER — METHYLPREDNISOLONE 4 MG/1
TABLET ORAL
Qty: 21 TABLET | Refills: 0 | Status: SHIPPED | OUTPATIENT
Start: 2023-07-31 | End: 2023-08-06

## 2023-07-31 RX ORDER — DEXAMETHASONE SODIUM PHOSPHATE 10 MG/ML
10 INJECTION INTRAMUSCULAR; INTRAVENOUS ONCE
Status: COMPLETED | OUTPATIENT
Start: 2023-07-31 | End: 2023-07-31

## 2023-07-31 RX ADMIN — DEXAMETHASONE SODIUM PHOSPHATE 10 MG: 10 INJECTION INTRAMUSCULAR; INTRAVENOUS at 23:58

## 2023-07-31 RX ADMIN — LIDOCAINE HYDROCHLORIDE 15 ML: 20 SOLUTION ORAL; TOPICAL at 23:58

## 2023-07-31 RX ADMIN — AMOXICILLIN 500 MG: 250 CAPSULE ORAL at 23:58

## 2023-07-31 ASSESSMENT — PAIN - FUNCTIONAL ASSESSMENT: PAIN_FUNCTIONAL_ASSESSMENT: 0-10

## 2023-07-31 ASSESSMENT — PAIN DESCRIPTION - ORIENTATION: ORIENTATION: INNER

## 2023-07-31 ASSESSMENT — PAIN DESCRIPTION - LOCATION: LOCATION: THROAT

## 2023-07-31 ASSESSMENT — PAIN SCALES - GENERAL: PAINLEVEL_OUTOF10: 8

## 2023-07-31 ASSESSMENT — PAIN DESCRIPTION - DESCRIPTORS: DESCRIPTORS: SORE

## 2023-08-01 ASSESSMENT — ENCOUNTER SYMPTOMS
SORE THROAT: 1
DIARRHEA: 0
EYE PAIN: 0
SHORTNESS OF BREATH: 0
VOMITING: 0
RHINORRHEA: 0
NAUSEA: 0
COUGH: 0
ABDOMINAL PAIN: 0

## 2023-08-01 NOTE — ED NOTES
DISCHARGE INSTRUCTIONS  Pt discharged home. A total of 0 written and 2 electronic prescriptions were discussed with pt. Pt educated on follow up appointments, diagnosis print outs, and medication list.  Pt verbalized understanding. All questions answered. Pt stable to go home. Pt was offered wheelchair, pt refused wheelchair.        Domenico Hurt RN  08/01/23 0001

## 2023-08-01 NOTE — ED PROVIDER NOTES
EMERGENCY DEPARTMENT HISTORY AND PHYSICAL EXAM            Please note that this dictation was completed with the assistance of \"Dragon\", the computer voice recognition software. Quite often unanticipated grammatical, syntax, homophones, and other interpretive errors are inadvertently transcribed by the computer software. Please disregard these errors and any errors that have escaped final proofreading. Thank you. Date of Evaluation: 08/01/23  Patient: Gemma Gao  Patient Age and Sex: 40 y.o. male   MRN: [id number]  CSN: [id number]  PCP: None None    History of Present Illness     Chief Complaint   Patient presents with    Pharyngitis     History Provided By: Patient/family/EMS (if available)    History is limited by: Nothing     HPI: Gemma Gao, 40 y.o. male with past medical history as documented below presents to the ED with c/o of 1 day history of mild to moderate sore throat. Patient denies any voice changes, difficulty swallowing. Patient has taken no medications yet for symptoms. Patient is fully vaccinated for COVID. No recent sick contacts. . Pt denies any other exacerbating or ameliorating factors. There are no other complaints, changes or physical findings pertinent to the HPI at this time. Nursing Notes were all reviewed and agreed with or any disagreements were addressed in the HPI. Past History   Past Medical History:  Past Medical History:   Diagnosis Date    Chlamydia        Past Surgical History:  No past surgical history on file. Family History:   Family history reviewed and was non-contributory, unless specified below:  No family history on file. Social History:  Social History     Tobacco Use    Smoking status: Every Day     Packs/day: 0.50     Types: Cigarettes    Smokeless tobacco: Never   Substance Use Topics    Alcohol use: Yes    Drug use: No       Allergies:  No Known Allergies    Current Medications:  No current facility-administered medications on file prior to encounter.

## 2023-08-03 LAB
BACTERIA SPEC CULT: ABNORMAL
BACTERIA SPEC CULT: ABNORMAL
SERVICE CMNT-IMP: ABNORMAL

## 2023-10-03 ENCOUNTER — HOSPITAL ENCOUNTER (EMERGENCY)
Facility: HOSPITAL | Age: 38
Discharge: HOME | End: 2023-10-03
Attending: EMERGENCY MEDICINE
Payer: MEDICAID

## 2023-10-03 ENCOUNTER — APPOINTMENT (OUTPATIENT)
Facility: HOSPITAL | Age: 38
End: 2023-10-03
Payer: MEDICAID

## 2023-10-03 VITALS
RESPIRATION RATE: 18 BRPM | HEIGHT: 67 IN | SYSTOLIC BLOOD PRESSURE: 139 MMHG | HEART RATE: 80 BPM | BODY MASS INDEX: 28.41 KG/M2 | DIASTOLIC BLOOD PRESSURE: 80 MMHG | TEMPERATURE: 98.5 F | WEIGHT: 181 LBS | OXYGEN SATURATION: 99 %

## 2023-10-03 DIAGNOSIS — R07.9 CHEST PAIN, UNSPECIFIED TYPE: Primary | ICD-10-CM

## 2023-10-03 DIAGNOSIS — M54.50 BILATERAL LOW BACK PAIN WITHOUT SCIATICA, UNSPECIFIED CHRONICITY: ICD-10-CM

## 2023-10-03 DIAGNOSIS — M94.0 COSTOCHONDRITIS: ICD-10-CM

## 2023-10-03 LAB
ALBUMIN SERPL-MCNC: 4.2 G/DL (ref 3.5–5)
ALBUMIN/GLOB SERPL: 1.1 (ref 1.1–2.2)
ALP SERPL-CCNC: 84 U/L (ref 45–117)
ALT SERPL-CCNC: 18 U/L (ref 12–78)
ANION GAP SERPL CALC-SCNC: 6 MMOL/L (ref 5–15)
AST SERPL-CCNC: 22 U/L (ref 15–37)
BASOPHILS # BLD: 0.1 K/UL (ref 0–0.1)
BASOPHILS NFR BLD: 1 % (ref 0–1)
BILIRUB SERPL-MCNC: 0.5 MG/DL (ref 0.2–1)
BUN SERPL-MCNC: 6 MG/DL (ref 6–20)
BUN/CREAT SERPL: 7 (ref 12–20)
CALCIUM SERPL-MCNC: 9.4 MG/DL (ref 8.5–10.1)
CHLORIDE SERPL-SCNC: 103 MMOL/L (ref 97–108)
CO2 SERPL-SCNC: 33 MMOL/L (ref 21–32)
CREAT SERPL-MCNC: 0.81 MG/DL (ref 0.7–1.3)
D DIMER PPP FEU-MCNC: 0.71 MG/L FEU (ref 0–0.65)
DIFFERENTIAL METHOD BLD: ABNORMAL
EOSINOPHIL # BLD: 0.1 K/UL (ref 0–0.4)
EOSINOPHIL NFR BLD: 1 % (ref 0–7)
ERYTHROCYTE [DISTWIDTH] IN BLOOD BY AUTOMATED COUNT: 14 % (ref 11.5–14.5)
ETHANOL SERPL-MCNC: <10 MG/DL (ref 0–0.08)
GLOBULIN SER CALC-MCNC: 3.7 G/DL (ref 2–4)
GLUCOSE SERPL-MCNC: 97 MG/DL (ref 65–100)
HCT VFR BLD AUTO: 40.9 % (ref 36.6–50.3)
HGB BLD-MCNC: 13.3 G/DL (ref 12.1–17)
IMM GRANULOCYTES # BLD AUTO: 0 K/UL (ref 0–0.04)
IMM GRANULOCYTES NFR BLD AUTO: 0 % (ref 0–0.5)
LYMPHOCYTES # BLD: 3.6 K/UL (ref 0.8–3.5)
LYMPHOCYTES NFR BLD: 33 % (ref 12–49)
MCH RBC QN AUTO: 27.6 PG (ref 26–34)
MCHC RBC AUTO-ENTMCNC: 32.5 G/DL (ref 30–36.5)
MCV RBC AUTO: 84.9 FL (ref 80–99)
MONOCYTES # BLD: 0.9 K/UL (ref 0–1)
MONOCYTES NFR BLD: 8 % (ref 5–13)
NEUTS SEG # BLD: 6.1 K/UL (ref 1.8–8)
NEUTS SEG NFR BLD: 57 % (ref 32–75)
NRBC # BLD: 0 K/UL (ref 0–0.01)
NRBC BLD-RTO: 0 PER 100 WBC
PLATELET # BLD AUTO: 247 K/UL (ref 150–400)
PMV BLD AUTO: 10.3 FL (ref 8.9–12.9)
POTASSIUM SERPL-SCNC: 4.4 MMOL/L (ref 3.5–5.1)
PROT SERPL-MCNC: 7.9 G/DL (ref 6.4–8.2)
RBC # BLD AUTO: 4.82 M/UL (ref 4.1–5.7)
SODIUM SERPL-SCNC: 142 MMOL/L (ref 136–145)
TROPONIN I SERPL HS-MCNC: 7 NG/L (ref 0–76)
WBC # BLD AUTO: 10.8 K/UL (ref 4.1–11.1)

## 2023-10-03 PROCEDURE — 80053 COMPREHEN METABOLIC PANEL: CPT

## 2023-10-03 PROCEDURE — 71046 X-RAY EXAM CHEST 2 VIEWS: CPT

## 2023-10-03 PROCEDURE — 85379 FIBRIN DEGRADATION QUANT: CPT

## 2023-10-03 PROCEDURE — 36415 COLL VENOUS BLD VENIPUNCTURE: CPT

## 2023-10-03 PROCEDURE — 6360000002 HC RX W HCPCS: Performed by: EMERGENCY MEDICINE

## 2023-10-03 PROCEDURE — 6370000000 HC RX 637 (ALT 250 FOR IP): Performed by: EMERGENCY MEDICINE

## 2023-10-03 PROCEDURE — 96374 THER/PROPH/DIAG INJ IV PUSH: CPT

## 2023-10-03 PROCEDURE — 99285 EMERGENCY DEPT VISIT HI MDM: CPT

## 2023-10-03 PROCEDURE — 85025 COMPLETE CBC W/AUTO DIFF WBC: CPT

## 2023-10-03 PROCEDURE — 71275 CT ANGIOGRAPHY CHEST: CPT

## 2023-10-03 PROCEDURE — 82077 ASSAY SPEC XCP UR&BREATH IA: CPT

## 2023-10-03 PROCEDURE — 6360000004 HC RX CONTRAST MEDICATION: Performed by: EMERGENCY MEDICINE

## 2023-10-03 PROCEDURE — 84484 ASSAY OF TROPONIN QUANT: CPT

## 2023-10-03 RX ORDER — METHOCARBAMOL 500 MG/1
1500 TABLET, FILM COATED ORAL
Status: COMPLETED | OUTPATIENT
Start: 2023-10-03 | End: 2023-10-03

## 2023-10-03 RX ORDER — KETOROLAC TROMETHAMINE 30 MG/ML
30 INJECTION, SOLUTION INTRAMUSCULAR; INTRAVENOUS
Status: COMPLETED | OUTPATIENT
Start: 2023-10-03 | End: 2023-10-03

## 2023-10-03 RX ORDER — LIDOCAINE 4 G/G
2 PATCH TOPICAL
Status: DISCONTINUED | OUTPATIENT
Start: 2023-10-03 | End: 2023-10-03 | Stop reason: HOSPADM

## 2023-10-03 RX ORDER — LIDOCAINE 50 MG/G
1 PATCH TOPICAL DAILY
Qty: 10 PATCH | Refills: 0 | Status: SHIPPED | OUTPATIENT
Start: 2023-10-03 | End: 2023-10-13

## 2023-10-03 RX ORDER — IBUPROFEN 800 MG/1
800 TABLET ORAL EVERY 8 HOURS PRN
Qty: 30 TABLET | Refills: 1 | Status: SHIPPED | OUTPATIENT
Start: 2023-10-03 | End: 2023-10-23

## 2023-10-03 RX ORDER — METHOCARBAMOL 750 MG/1
750 TABLET, FILM COATED ORAL 4 TIMES DAILY
Qty: 40 TABLET | Refills: 0 | Status: SHIPPED | OUTPATIENT
Start: 2023-10-03 | End: 2023-10-13

## 2023-10-03 RX ADMIN — KETOROLAC TROMETHAMINE 30 MG: 30 INJECTION, SOLUTION INTRAMUSCULAR; INTRAVENOUS at 16:35

## 2023-10-03 RX ADMIN — METHOCARBAMOL TABLETS 1500 MG: 500 TABLET, COATED ORAL at 16:26

## 2023-10-03 RX ADMIN — IOPAMIDOL 100 ML: 755 INJECTION, SOLUTION INTRAVENOUS at 18:42

## 2023-10-03 ASSESSMENT — PAIN DESCRIPTION - LOCATION: LOCATION: CHEST

## 2023-10-03 ASSESSMENT — PAIN DESCRIPTION - DESCRIPTORS: DESCRIPTORS: ACHING

## 2023-10-03 ASSESSMENT — PAIN - FUNCTIONAL ASSESSMENT: PAIN_FUNCTIONAL_ASSESSMENT: 0-10

## 2023-10-03 ASSESSMENT — PAIN SCALES - GENERAL: PAINLEVEL_OUTOF10: 8

## 2023-10-03 ASSESSMENT — PAIN DESCRIPTION - ORIENTATION: ORIENTATION: LEFT

## 2023-10-03 NOTE — ED PROVIDER NOTES
[facility] EMERGENCY DEPT  EMERGENCY DEPARTMENT ENCOUNTER       Pt Name: Maikol Lindquist  MRN: [id number]  [address] 1985  Date of evaluation: 10/3/2023  Provider: Nadine Irvin MD   PCP: None, None  Note Started: 8:11 PM 10/3/23     CHIEF COMPLAINT       Chief Complaint   Patient presents with    Lower Back Pain    Chest Pain        HISTORY OF PRESENT ILLNESS: 1 or more elements      History From: patient, History limited by:  none     Maikol Lindquist is a 41 y.o. male who presents ambulatory complaining of acute on chronic low back pain which returned yesterday and new onset chest pain which began yesterday. He first noticed the back pain years ago when he was working in the kitchen and bent over, felt a pull, then could not get up. Its been intermittent since then. He has no idea what flared it up at this time. Denies any heavy listing or new activities. Works as a . Patient denies numbness or tingling of legs or radiation of pain down legs. Denies loss of control of bladder or bowel. States it hurts to sit or  his legs. Chest pain is left precordial .It is worse with palpation coughing, laughing, breathing. Denies pain on eating. He states he has had an ER visit as well as a cardiology work-up for chest pain before which occurred in the setting of drinking many energy drinks. He states that he never went to his follow-up appointment with cardiology (only had an initial video visit with a cardiologist). Denies diaphoresis, nausea, vomiting, dyspnea. Patient is a smoker but he has no other PE risk factors. (Specifically denies prior PE or DVT, recent long car trips or plane rides, recent surgery or trauma, known cancer (.     Nursing Notes were all reviewed and agreed with or any disagreements were addressed in the HPI. REVIEW OF SYSTEMS        Positives and Pertinent negatives as per HPI.     PAST HISTORY     Past Medical History:  Past Medical History:   Diagnosis Date

## 2023-10-03 NOTE — ED TRIAGE NOTES
Initial Post-op: Hardware Removal  Home Instruction Sheet    Activity:    Activity Status:.  Weight bearing status:  [] Weight bear as tolerated (WBAT).  [x] Non-weight bearing with the surgical arm in the sling.  May remove sling for daily hygiene and passive range of motion exercises.  Keep pillow behind surgical elbow to keep shoulder from extension.    No lifting, pushing, pulling, or reaching with your surgical arm.  Do not drive a motor vehicle, operate machinery or appliances, make important decisions, sign legal documents, or drink alcohol for at least the next 24 hours.  Continue these restrictions while you are taking pain medications.  No lifting, pushing, pulling, or reaching with your surgical arm.   Use cold therapy cold compress continuously by rotating 20 - 30 minutes on and 20 - 30 minutes off for first 48 - 72 hours after surgery.  Did not apply cold directly to the skin.  After first 48 - 72 hours, use cold therapy as needed for pain and swelling.  It is common to have mild to moderate swelling in your upper and lower arm.  This should improve over the first five (5) days.  To help decrease this swelling, lie down on the non-surgical side with a pillow propped behind your back so the surgical shoulder is up or above the level of the heart.    If provided with compression stockings (DANA hose) wear these during the day until you return for 1st post-op appointment; OK to remove @ night.  Do frequent ankle pumps to help prevent blood clots in your legs.    Sling: must be worn at all times, except when bathing or dressing. No exceptions. Do not modify the sling.    Bandage and Wound Care:    No baths, hot tubs, or swimming until advised by your doctor, usually 2-3 weeks.  Able to remove postoperative dressings postop day 2 and if no drainage is noted able to proceed with showering.  Dermabond glue was utilized and will wear off over time.    MEDICATIONS    Postop days 1 - 2:  [x]  Pain medication as  Pt reports left sided chest pain during palpation, and bilateral lower back pain x 2 days prescribed:  Norco 5/325 mg, take 1-2 tablets every 4-6 hours as needed for pain  [x]  Start over-the-counter anti-inflammatory medication such as Aleve, naproxen, ibuprofen, or Advil, if able to tolerate.         * Aleve/naproxen 220 mg tablets:  Take 2 tablets twice daily        * Ibuprofen/Advil 200 mg tablets:  Take 400 mg up to 6 times per day.    Postop days 3-10:  Norco 5/325 mg:  Decreased to 1-2 tablets at nighttime only until prescription is gone.  Continue anti-inflammatory medication Aleve, Advil, ibuprofen, or naproxen if able to tolerate.  Start over-the-counter extra strength Tylenol / acetaminophen 500 mg tablets.  You can take safely up to 4000 mg per day.     * Taking 2 tablets every 6 hours = 4,000 mg per day but keep in mind that each Percocet tablet has 325 mg of Tylenol in it.    After postop day 10:  Continue anti-inflammatory medication such as:  Aleve, Advil, ibuprofen, or naproxen, if able to tolerate.  Continue extra strength Tylenol / acetaminophen 500 mg tablet:  2 tablets every 6 hours, for maximal of 4000 mg per day.    Narcotic medications:  Take only as prescribed. Store in a place inaccessible to children/pets.  Do not share medicine. Do not crush, snort, dissolve, or inject the medicine. If you cannot swallow medicine, please call.   Do NOT drink alcohol while on this medicine.  Leftover narcotic medicine may be taken to police departments/ office    B.  Clotting Prevention  [x]  Blood thinner: Aspirin 81 mg as prescribed for 4 weeks unless otherwise advised.  Exercises consisting of walking around the house or going out for short walks outdoors, home exercises (if prescribed) and Kristopher Stockings.       **NOTE** Highly encouraged to be moving around to prevent blood clot and aid in circulation for wound healing.      C.  Other Medications  *  Multi-purpose vitamin daily x 30 days.  Make sure has vitamin B and C    *  Unless you are on current regime prior, proceed with Vitamin  D supplement as prescribed:  5,000 IU daily until further advised.  *  Stool softener:  Over the counter Colace twice daily until off all pain medication and normal stool.  *  Continue with current home medication regime.  Any concerns about them contact      ** If you are diabetic, it is critical that you maintain well controlled blood sugar levels because elevated blood sugar levels will significantly increase the risk of infection and affect the healing process.  If issues with controlling please contact your primary care physician.    Surgery Nutrition      Post-op Nutrition Guidelines  Key for a successful recovery and help getting back to things you enjoy sooner. Post-op nutrition is essential to a successful recovery.  Surgery increases the demand tenfold, so fueling is critical with food along with promoting wound healing and preventing infection.    1.  Protein- eat protein at each meal and snack for muscle and bone healing.  Example: Meat, eggs, poultry or dried beans and tofu. Ideal 1.5-2.0 g/kg body weight.  2.  Fiber - Added to each meal and snacks fruits, vegetables, whole grains in helping maintain normal bowel movement. In addition a stool softener prescribed.  3.  Vitamin C - important in assisting with wound healing and assisting in bone formation along with tissue healing. Examples: Citrus fruits, green and red peppers, broccoli, spinach, blueberries, and strawberries. Estimated 1000 mg daily.  4.  Calcium - key for bone formation. Examples: Milk, yogurt and cheese, almonds, whey protein, green leafy veggies, along with recommend vitamin D supplementation for better calcium absorption.   5.  Water - aid in avoiding constipation as well as recommended for wound healing and skin health. Recommended 6-8 glasses a day.  6.  Vitamin A - important for growth and development for the maintenance of the immune system and bone repair, white blood cells and mucous membranes.  Examples: Dark greenleafy  vegetables, Orange and yellow vegetables, cantaloupe, fortified dairy products and cereal.  7.  Zinc - health wound healing and aid in helping immune system fight off invading bacteria and viruses. Examples: Fortified cereals, red meat and seafood, eggs and seeds.  8.  Vitamin D - length to shorter recovery time needed assist GI tract to obsorb calcium. Recommended 3717-6345 international units daily.  9.  Antioxidants - help in reducing free radicals that can damage cells and affect recovery.  Examples: Dark chocolate, artichokes, blueberries, strawberries, pecans, raspberries, kale, beans, red cabbage, beets and spinach.      Diet:  Start with clear liquids, advance to solid food to decrease your chance of nausea.  Resume your normal diet when tolerated.   Try and increase your fluids, fibers, fruits, and vegetables to prevent constipation from pain medications.       ** If you are diabetic, it is critical that you maintain well controlled blood sugar levels because elevated blood sugar levels will significantly increase the risk of infection and affect the healing process.  If issues controlling please contact your primary care physician.    Call Your Doctor If:  You have severe pain not controlled by pain medications.  Increased incisional swelling, redness, heat or bleeding.  Incisional drainage that last more than 3 days after surgery.  Numbness or tingling in the arm/shoulder.  Temperature greater than 101ºF.  If you are unable to empty your bladder in 8 - 12 hours after your surgery.      If you have any questions or concerns, call your physician office that is available 24 hours, 7days a week.    Physician:  Sd Montalvo MD              Physician Assistant:  Darian Ledezma PA-C, CIC, ATC, CSCS                     Office Address:  [facility]                                       [address] [place] [address]     Office Phone: [phone]                                                    [phone]         Follow-up Appointment with doctor ________________________________                                I have read and understand these instructions and received a copy.  I have received my belongings from home.              Signature of Patient or Patient Representative:             Signature of Nurse:      Date:  ________Time:         POST SURGERY EXERCISES   Perform only if instructed by surgeon or discharge nurse  Begin 1st day after surgery, 2-3x per day, --15 repetitions each time    Shoulder Exercises:    [x]  NO exercises until instructed by your doctor. It is OK to perform gentle range of motion exercises with the hand and wrist.  Hand and finger squeezes.      []  Start your exercises after your first dressing change (see attached sheet). Do 2-3 times each day.     1.  Wrist Squeezes / Finger activity  Use a towel and perform daily squeezes with surgical hand.  This will aid in reducing swelling in the hand. Perform wrist activities.     .    Home Instruction Sheet  ANESTHESIA for ADULT     What are the side effects?  Side effects depend on the medication used, and may not even be present.    Most Common Sometimes   Irritability  Poor Balance  Sleeping for Several Hours  Drowsiness  Fatigue  Difficulty Concentrating Change in Behavior  Hyperactivity  Nausea (upset stomach)  Gas (flatulence)  Dizziness  Hiccups  Constipation  Blurred Vision     The effects of sedation medicine can last up to 24 hours.  You may be drowsy or irritable for 2 to 8 hours after receiving medicine.  You may need to sleep after leaving the testing area.  Sleeping after sedation will help reduce irritability.  Diet:  Do not give anything to eat or drink until you are fully awake.  Eat a light meal and advance to a normal diet unless instructed differently:   Do not drink alcohol beverages for the next 24 hours.  Avoid greasy or spicy foods today.  Activity:  You may be dizzy and/or unsteady.  Ask for help  walking, using the bathroom, or stairs to protect yourself from injury.  You should not drive a vehicle, operate machinery or power tools for 24 hours because your reflexes may be slow and your vision may be blurry.  Do not sign any legally binding documents or make important decisions for 24 hours after receiving sedation.  Medications:  Unless told otherwise, do not take any non-prescription medications (cold medicine, etc.) for 24 hours, as these medications in combination with sedation medication, can cause increased drowsiness.  If you feel that you need over the counter medication, discuss with your physician.    When Should I Call the Doctor?  Vomiting more than twice.  Extreme irritability or unusual changes in behavior.  Trouble arousing.  Inability to urinate.  Trouble breathing - call 911.        I have read and understand these instructions and received a copy.  I have received my belongings from home.    We would like to take this opportunity to thank you for choosing [facility]. Because your confidence in your caregivers is very important to us, it is our commitment to always treat you and your family with courtesy and respect. Our goal is to always answer any questions or worries or concerns you may have about the care you received If you have any suggestions for improvement or worries or concerns please do not hesitate to let us know.                                                                         Signature of  Patient Representative:             Signature of Discharge Nurse :     Date:    Time:

## 2023-10-03 NOTE — ED NOTES
Verbal shift change report given to Chery Osman, RN (oncoming nurse) by Mis Roberts, Student RN (offgoing nurse). Report included the following information ED Encounter Summary, ED SBAR, Intake/Output, MAR, and Recent Results.         Jolie Zapata  10/03/23 1320

## 2023-10-03 NOTE — ED NOTES
DISCHARGE INSTRUCTIONS  Pt discharged home. A total of 0 written and 3 electronic prescriptions were discussed with pt. Pt educated on follow up appointments, diagnosis print outs, and medication list.  Pt verbalized understanding. All questions answered. Pt stable to go home. Pt was offered wheelchair, pt refused wheelchair.        Domenico Hurt RN  10/03/23 7624

## 2023-10-04 LAB
EKG ATRIAL RATE: 70 BPM
EKG DIAGNOSIS: NORMAL
EKG P AXIS: 69 DEGREES
EKG P-R INTERVAL: 150 MS
EKG Q-T INTERVAL: 362 MS
EKG QRS DURATION: 80 MS
EKG QTC CALCULATION (BAZETT): 390 MS
EKG R AXIS: 46 DEGREES
EKG T AXIS: 40 DEGREES
EKG VENTRICULAR RATE: 70 BPM

## 2023-10-09 NOTE — PROGRESS NOTES
Subjective:   Hope Ramirez is a 41 y.o.  male with a past medical history significant for chronic tobacco use who presents the clinic today for post ED follow-up visit. Mr. Jacob Neri was seen in the ED earlier this month (October 2023) for acute on chronic low back pain as well as acute chest pain that was reproducible on exam and worsened with activities like coughing, sneezing, and laughing. His medical work-up in the ED was unremarkable, which included EKG, troponin, chest x-ray, CTA of the chest.    He presents the clinic today with a slightly above goal blood pressure of 142/84. Mr. Jacob Neri tells me that he has been experiencing pretty regular bouts of chest left-sided chest pain for at least the last month or so. He describes the pain as a sharp \"poking/pinching\" sensation this seems to be worsened with coughing, laughing, sneezing, bending over, and sometimes with physical exertion. The symptoms are sometimes associated with feeling a little bit clammy, but never with dyspnea, palpitations, presyncope, or other cardiopulmonary symptoms. He was seen at [facility] recently for the symptoms and apparently underwent stress test and echocardiogram.  He never followed up to get his test results back. He has been using topical lidocaine for his pain, which does seem to help somewhat. Robaxin and ibuprofen have not been very effective. His social history is remarkable for 1-1.5 PPD x at least 15 years. He does express interest to quit. He drinks 12 to 24 ounces of Bud Light a few days a week. He reports regular marijuana use and occasional cocaine use. It does not sound like either of these substances have ever induced acute chest discomfort. His family history is unremarkable for any known cardiovascular disease, with the exception of an uncle who had a stroke in his 45s.       Primary Care Physician: None, None    Tobacco use: 1-1.5 PPD times about 15 years  Alcohol

## 2023-10-10 ENCOUNTER — OFFICE VISIT (OUTPATIENT)
Age: 38
End: 2023-10-10
Payer: MEDICAID

## 2023-10-10 VITALS
SYSTOLIC BLOOD PRESSURE: 142 MMHG | OXYGEN SATURATION: 99 % | DIASTOLIC BLOOD PRESSURE: 84 MMHG | BODY MASS INDEX: 28.56 KG/M2 | HEIGHT: 67 IN | HEART RATE: 87 BPM | WEIGHT: 182 LBS

## 2023-10-10 DIAGNOSIS — M94.0 COSTOCHONDRITIS: ICD-10-CM

## 2023-10-10 DIAGNOSIS — R07.9 CHEST PAIN, UNSPECIFIED TYPE: Primary | ICD-10-CM

## 2023-10-10 DIAGNOSIS — Z72.0 TOBACCO USE: ICD-10-CM

## 2023-10-10 PROCEDURE — 99203 OFFICE O/P NEW LOW 30 MIN: CPT | Performed by: NURSE PRACTITIONER

## 2023-10-10 RX ORDER — DICLOFENAC SODIUM 75 MG/1
75 TABLET, DELAYED RELEASE ORAL 2 TIMES DAILY PRN
Qty: 30 TABLET | Refills: 1 | Status: SHIPPED | OUTPATIENT
Start: 2023-10-10

## 2023-10-10 ASSESSMENT — VISUAL ACUITY: OU: 1

## 2023-10-10 NOTE — PATIENT INSTRUCTIONS
I will work on getting your records from [facility] cardiology and try to give you a call as soon as I am able to get this. If I am unable to retrieve the records, I will also give you a call. Please read over some of the information about costochondritis. Instead of taking ibuprofen you can take diclofenac up to twice daily as needed to see if this helps the pain a little bit more. Do your best to either wean off of cigarettes or try quitting altogether. If you are having some difficulty let us know and there are a few things I could discuss with you to see if it helps.

## 2023-10-16 ENCOUNTER — TELEPHONE (OUTPATIENT)
Age: 38
End: 2023-10-16

## 2023-10-16 NOTE — TELEPHONE ENCOUNTER
I have been trying to meet Amy Phu Thomas to speak with him about his recent testing at [facility] cardiology. Normal stress echo. Resting echo shows mild to severe TR, mild to moderate MR, mild RA dilation, and mild LV dilation. EF 60 to 65%. No significant LVH. Normal diastolic function. I have left voice messages requesting that he call us back as soon as possible to discuss some of these results.

## 2023-10-16 NOTE — TELEPHONE ENCOUNTER
Mr. Jason Martinez call the office back this afternoon and we talked a little bit about his recent testing [facility]. Normal stress test - further supportive his atypical pains are not cardiac related. Echocardiogram - mild to moderate MR with moderate to severe TR. Likely multifactorial and could be due to cocaine use, chronic tobacco use, and possibly untreated sleep apnea, and perhaps even a period of unmanaged hypertension. He states that he is going to start a walking/jogging regimen this week and give me a call back within the next few days to keep me posted on how that is going. Currently not complaining of any peripheral edema or dyspnea. I advised him that it is very important to follow-up with me next month as planned for a follow-up visit so we can further discuss some of the valvular abnormalities detected on his recent echo.

## 2024-05-01 ENCOUNTER — HOSPITAL ENCOUNTER (EMERGENCY)
Facility: HOSPITAL | Age: 39
Discharge: HOME | End: 2024-05-01
Attending: EMERGENCY MEDICINE
Payer: MEDICAID

## 2024-05-01 VITALS
WEIGHT: 182.1 LBS | HEART RATE: 72 BPM | DIASTOLIC BLOOD PRESSURE: 82 MMHG | BODY MASS INDEX: 28.58 KG/M2 | SYSTOLIC BLOOD PRESSURE: 145 MMHG | RESPIRATION RATE: 17 BRPM | TEMPERATURE: 98.3 F | OXYGEN SATURATION: 100 % | HEIGHT: 67 IN

## 2024-05-01 DIAGNOSIS — J02.9 VIRAL PHARYNGITIS: Primary | ICD-10-CM

## 2024-05-01 DIAGNOSIS — J04.0 LARYNGITIS: ICD-10-CM

## 2024-05-01 LAB — DEPRECATED S PYO AG THROAT QL EIA: NEGATIVE

## 2024-05-01 PROCEDURE — 87880 STREP A ASSAY W/OPTIC: CPT

## 2024-05-01 PROCEDURE — 6360000002 HC RX W HCPCS: Performed by: EMERGENCY MEDICINE

## 2024-05-01 PROCEDURE — 6370000000 HC RX 637 (ALT 250 FOR IP): Performed by: EMERGENCY MEDICINE

## 2024-05-01 PROCEDURE — 87070 CULTURE OTHR SPECIMN AEROBIC: CPT

## 2024-05-01 PROCEDURE — 99283 EMERGENCY DEPT VISIT LOW MDM: CPT

## 2024-05-01 RX ORDER — DEXAMETHASONE SODIUM PHOSPHATE 10 MG/ML
10 INJECTION, SOLUTION INTRAMUSCULAR; INTRAVENOUS ONCE
Status: COMPLETED | OUTPATIENT
Start: 2024-05-01 | End: 2024-05-01

## 2024-05-01 RX ORDER — IBUPROFEN 400 MG/1
800 TABLET ORAL ONCE
Status: COMPLETED | OUTPATIENT
Start: 2024-05-01 | End: 2024-05-01

## 2024-05-01 RX ORDER — DEXAMETHASONE SODIUM PHOSPHATE 10 MG/ML
10 INJECTION, SOLUTION INTRAMUSCULAR; INTRAVENOUS ONCE
Status: DISCONTINUED | OUTPATIENT
Start: 2024-05-01 | End: 2024-05-01

## 2024-05-01 RX ADMIN — DEXAMETHASONE SODIUM PHOSPHATE 10 MG: 10 INJECTION, SOLUTION INTRAMUSCULAR; INTRAVENOUS at 04:58

## 2024-05-01 RX ADMIN — ALUMINUM HYDROXIDE, MAGNESIUM HYDROXIDE, AND SIMETHICONE 40 ML: 1200; 120; 1200 SUSPENSION ORAL at 04:55

## 2024-05-01 RX ADMIN — IBUPROFEN 800 MG: 400 TABLET, FILM COATED ORAL at 04:55

## 2024-05-01 ASSESSMENT — PAIN - FUNCTIONAL ASSESSMENT: PAIN_FUNCTIONAL_ASSESSMENT: 0-10

## 2024-05-01 ASSESSMENT — PAIN SCALES - GENERAL: PAINLEVEL_OUTOF10: 7

## 2024-05-01 ASSESSMENT — PAIN DESCRIPTION - DESCRIPTORS: DESCRIPTORS: SORE

## 2024-05-01 ASSESSMENT — PAIN DESCRIPTION - ORIENTATION: ORIENTATION: INNER

## 2024-05-01 ASSESSMENT — PAIN DESCRIPTION - LOCATION: LOCATION: THROAT

## 2024-05-01 NOTE — ED PROVIDER NOTES
[facility] EMERGENCY DEPT  EMERGENCY DEPARTMENT ENCOUNTER       Pt Name: Iván Lemus  MRN: [id number]  Birthdate 1985  Date of evaluation: 5/1/2024  Provider: Evert Wiseman DO   PCP: None, None (Inactive)  Note Started: 4:34 AM EDT 5/1/24     CHIEF COMPLAINT       Chief Complaint   Patient presents with    Pharyngitis     With cough        HISTORY OF PRESENT ILLNESS: 1 or more elements      History From: Patient, History limited by: none     Iván Lemus is a 38 y.o. male presenting the emergency department complaining of a sore throat.  Reports that his voice is gone as well.  Denies any fever, does report a cough.       Please See MDM for Additional Details of the HPI/PMH  Nursing Notes were all reviewed and agreed with or any disagreements were addressed in the HPI.     REVIEW OF SYSTEMS        Positives and Pertinent negatives as per HPI.    PAST HISTORY     Past Medical History:  Past Medical History:   Diagnosis Date    Tobacco use        Past Surgical History:  No past surgical history on file.    Family History:  No family history on file.    Social History:  Social History     Tobacco Use    Smoking status: Every Day     Current packs/day: 0.50     Types: Cigarettes    Smokeless tobacco: Never   Substance Use Topics    Alcohol use: Yes    Drug use: Yes     Types: Marijuana (Weed)       Allergies:  No Known Allergies    CURRENT MEDICATIONS      Previous Medications    DICLOFENAC (VOLTAREN) 75 MG EC TABLET    Take 1 tablet by mouth 2 times daily as needed for Pain       SCREENINGS               No data recorded         PHYSICAL EXAM      ED Triage Vitals [05/01/24 0425]   Enc Vitals Group      BP (!) 145/82      Pulse 72      Respirations 17      Temp 98.3 °F (36.8 °C)      Temp Source Oral      SpO2 100 %      Weight - Scale 82.6 kg (182 lb 1.6 oz)      Height 1.702 m (5' 7\")      Head Circumference       Peak Flow       Pain Score       Pain Loc       Pain Edu?       Excl. in GC?         Physical Exam  Vitals and

## 2024-05-01 NOTE — ED TRIAGE NOTES
Pt presents ambulatory to ED with c/o cough, sore throat, loss of voice beginning 2 days ago.  Pt reports taking OTC meds and Ricolas

## 2024-05-03 LAB
BACTERIA SPEC CULT: NORMAL
SERVICE CMNT-IMP: NORMAL

## 2024-07-16 ENCOUNTER — HOSPITAL ENCOUNTER (EMERGENCY)
Facility: HOSPITAL | Age: 39
Discharge: HOME | End: 2024-07-16

## 2024-07-16 ENCOUNTER — APPOINTMENT (OUTPATIENT)
Facility: HOSPITAL | Age: 39
End: 2024-07-16

## 2024-07-16 VITALS
RESPIRATION RATE: 18 BRPM | SYSTOLIC BLOOD PRESSURE: 137 MMHG | TEMPERATURE: 98 F | WEIGHT: 188 LBS | HEIGHT: 67 IN | OXYGEN SATURATION: 98 % | BODY MASS INDEX: 29.51 KG/M2 | DIASTOLIC BLOOD PRESSURE: 69 MMHG | HEART RATE: 78 BPM

## 2024-07-16 DIAGNOSIS — M53.9 MULTILEVEL DEGENERATIVE DISC DISEASE: ICD-10-CM

## 2024-07-16 DIAGNOSIS — M54.40 ACUTE BILATERAL LOW BACK PAIN WITH SCIATICA, SCIATICA LATERALITY UNSPECIFIED: Primary | ICD-10-CM

## 2024-07-16 PROCEDURE — 6360000002 HC RX W HCPCS: Performed by: PHYSICIAN ASSISTANT

## 2024-07-16 PROCEDURE — 72100 X-RAY EXAM L-S SPINE 2/3 VWS: CPT

## 2024-07-16 PROCEDURE — 99283 EMERGENCY DEPT VISIT LOW MDM: CPT

## 2024-07-16 PROCEDURE — 6370000000 HC RX 637 (ALT 250 FOR IP): Performed by: PHYSICIAN ASSISTANT

## 2024-07-16 RX ORDER — LIDOCAINE 50 MG/G
1 PATCH TOPICAL DAILY
Qty: 10 PATCH | Refills: 0 | Status: SHIPPED | OUTPATIENT
Start: 2024-07-16 | End: 2024-07-26

## 2024-07-16 RX ORDER — METHYLPREDNISOLONE 4 MG/1
TABLET ORAL
Qty: 1 KIT | Refills: 0 | Status: SHIPPED | OUTPATIENT
Start: 2024-07-16

## 2024-07-16 RX ORDER — DICLOFENAC SODIUM 75 MG/1
75 TABLET, DELAYED RELEASE ORAL 2 TIMES DAILY
Qty: 30 TABLET | Refills: 0 | Status: SHIPPED | OUTPATIENT
Start: 2024-07-16

## 2024-07-16 RX ORDER — DEXAMETHASONE SODIUM PHOSPHATE 10 MG/ML
4 INJECTION, SOLUTION INTRAMUSCULAR; INTRAVENOUS ONCE
Status: COMPLETED | OUTPATIENT
Start: 2024-07-16 | End: 2024-07-16

## 2024-07-16 RX ORDER — DIAZEPAM 5 MG/1
5 TABLET ORAL ONCE
Status: COMPLETED | OUTPATIENT
Start: 2024-07-16 | End: 2024-07-16

## 2024-07-16 RX ORDER — LIDOCAINE 4 G/G
1 PATCH TOPICAL
Status: DISCONTINUED | OUTPATIENT
Start: 2024-07-16 | End: 2024-07-16 | Stop reason: HOSPADM

## 2024-07-16 RX ORDER — METHOCARBAMOL 750 MG/1
750 TABLET, FILM COATED ORAL 4 TIMES DAILY
Qty: 40 TABLET | Refills: 0 | Status: SHIPPED | OUTPATIENT
Start: 2024-07-16 | End: 2024-07-26

## 2024-07-16 RX ORDER — IBUPROFEN 600 MG/1
600 TABLET ORAL EVERY 6 HOURS PRN
COMMUNITY
End: 2024-07-16

## 2024-07-16 RX ADMIN — DEXAMETHASONE SODIUM PHOSPHATE 4 MG: 10 INJECTION, SOLUTION INTRAMUSCULAR; INTRAVENOUS at 13:32

## 2024-07-16 RX ADMIN — DIAZEPAM 5 MG: 5 TABLET ORAL at 13:33

## 2024-07-16 ASSESSMENT — PAIN DESCRIPTION - DESCRIPTORS: DESCRIPTORS: SHOOTING

## 2024-07-16 ASSESSMENT — PAIN - FUNCTIONAL ASSESSMENT: PAIN_FUNCTIONAL_ASSESSMENT: 0-10

## 2024-07-16 ASSESSMENT — PAIN SCALES - GENERAL: PAINLEVEL_OUTOF10: 9

## 2024-07-16 ASSESSMENT — LIFESTYLE VARIABLES: HOW OFTEN DO YOU HAVE A DRINK CONTAINING ALCOHOL: NEVER

## 2024-07-16 ASSESSMENT — PAIN DESCRIPTION - LOCATION: LOCATION: BACK

## 2024-07-16 ASSESSMENT — PAIN DESCRIPTION - ORIENTATION: ORIENTATION: LOWER

## 2024-07-16 NOTE — ED PROVIDER NOTES
[facility] EMERGENCY DEPT  EMERGENCY DEPARTMENT ENCOUNTER       Pt Name: Iván Lemus  MRN: [id number]  Birthdate 1985  Date of evaluation: 7/16/2024  Provider: ROSE Godinez   PCP: None, None  Note Started: 2:20 PM EDT 7/16/24     CHIEF COMPLAINT       Chief Complaint   Patient presents with    Back Pain        HISTORY OF PRESENT ILLNESS: 1 or more elements      History From: Patient  None     Iván Lemus is a 38 y.o. male who presents to the ED for evaluation of mild to moderate, constant low back pain. States this is an acute on chronic issue for him.  States this particular episode been bothering him the past several days, denies any specific injury.  States the pain is an achy spasmy pain to his low back.  Also states the pain is worse with certain movements, especially bending and twisting. Hard to find a comfortable position. Pain intermittently radiates down the lateral aspect of his leg. States the pain is sometimes on one side, and sometimes on the other side. Denies any bowel or bladder incontinence/retention, or saddle anesthesias.  Denies any extremity numbness, weakness, or tingling.  No prior history of back surgeries.  Denies fevers, chest pain, shortness of breath, abdominal pain, nausea, vomiting, diarrhea, blood in urine or stool.  Has been using NSAIDs with minimal improvement.     Nursing Notes were all reviewed and agreed with or any disagreements were addressed in the HPI.     REVIEW OF SYSTEMS      Review of Systems   All other systems reviewed and are negative.       Positives and Pertinent negatives as per HPI.    PAST HISTORY     Past Medical History:  Past Medical History:   Diagnosis Date    Tobacco use        Past Surgical History:  History reviewed. No pertinent surgical history.    Family History:  History reviewed. No pertinent family history.    Social History:  Social History     Tobacco Use    Smoking status: Every Day     Current packs/day: 0.50     Types: Cigarettes    Smokeless

## 2024-07-16 NOTE — ED TRIAGE NOTES
Pt reports having chronic lower back pain but reports it flared up again after moving the wrong way Sunday. Pt reports pain shoots down his legs. Pt reports taking prescribed diclofenac with no relief.

## 2024-07-16 NOTE — DISCHARGE INSTRUCTIONS
Steroid taper as prescribed (starting tomorrow, as your first dose was given to you here in the ER).  Alternate Diclofenac (oral NSAID -- do not take with other NSAIDs such as Ibuprofen, advil, aleve, naproxen or motrin) and Tylenol as directed, as needed for pain   Robaxin as directed, as needed for muscle pain and spasm (be careful this medication may cause drowsiness, therefore do not take with other sedating substances)  Lidoderm patches as directed, as needed for pain   Rest, ice/heat, stretching  Follow-up with your primary care provider   Follow-up with orthopedics/spine   Return precautions as we discussed     Thank You!    It was a pleasure taking care of you in our Emergency Department today. We know that when you come to [facility], you are entrusting us with your health, comfort, and safety. Our clinicians honor that trust, and truly appreciate the opportunity to care for you and your loved ones.    If you receive a survey about your Emergency Department experience today, please fill it out.  We value your feedback. Thank you.      Ronda Ash PA-C    ___________________________________  I have included a copy of your lab results and/or radiologic studies from today's visit so you can have them easily available at your follow-up visit.   No results found for this or any previous visit (from the past 12 hour(s)).    XR LUMBAR SPINE (2-3 VIEWS)   Final Result   Mild degenerative changes, without acute abnormality.                  Electronically signed by MERVAT CLEMENTS        [unfilled]

## 2024-07-29 ENCOUNTER — HOSPITAL ENCOUNTER (EMERGENCY)
Facility: HOSPITAL | Age: 39
Discharge: HOME | End: 2024-07-29
Attending: STUDENT IN AN ORGANIZED HEALTH CARE EDUCATION/TRAINING PROGRAM

## 2024-07-29 ENCOUNTER — APPOINTMENT (OUTPATIENT)
Facility: HOSPITAL | Age: 39
End: 2024-07-29

## 2024-07-29 VITALS
HEART RATE: 98 BPM | DIASTOLIC BLOOD PRESSURE: 73 MMHG | TEMPERATURE: 98.4 F | HEIGHT: 67 IN | WEIGHT: 186.73 LBS | SYSTOLIC BLOOD PRESSURE: 134 MMHG | OXYGEN SATURATION: 100 % | BODY MASS INDEX: 29.31 KG/M2 | RESPIRATION RATE: 18 BRPM

## 2024-07-29 DIAGNOSIS — U07.1 COVID-19: Primary | ICD-10-CM

## 2024-07-29 LAB
FLUAV RNA SPEC QL NAA+PROBE: NOT DETECTED
FLUBV RNA SPEC QL NAA+PROBE: NOT DETECTED
SARS-COV-2 RNA RESP QL NAA+PROBE: DETECTED

## 2024-07-29 PROCEDURE — 6370000000 HC RX 637 (ALT 250 FOR IP)

## 2024-07-29 PROCEDURE — 99284 EMERGENCY DEPT VISIT MOD MDM: CPT

## 2024-07-29 PROCEDURE — 71046 X-RAY EXAM CHEST 2 VIEWS: CPT

## 2024-07-29 PROCEDURE — 87636 SARSCOV2 & INF A&B AMP PRB: CPT

## 2024-07-29 RX ORDER — IBUPROFEN 400 MG/1
800 TABLET ORAL
Status: COMPLETED | OUTPATIENT
Start: 2024-07-29 | End: 2024-07-29

## 2024-07-29 RX ADMIN — IBUPROFEN 800 MG: 400 TABLET, FILM COATED ORAL at 17:37

## 2024-07-29 ASSESSMENT — PAIN SCALES - GENERAL: PAINLEVEL_OUTOF10: 6

## 2024-07-29 ASSESSMENT — PAIN - FUNCTIONAL ASSESSMENT
PAIN_FUNCTIONAL_ASSESSMENT: NONE - DENIES PAIN
PAIN_FUNCTIONAL_ASSESSMENT: ACTIVITIES ARE NOT PREVENTED

## 2024-07-29 ASSESSMENT — PAIN DESCRIPTION - LOCATION: LOCATION: GENERALIZED

## 2024-07-29 ASSESSMENT — PAIN DESCRIPTION - ORIENTATION: ORIENTATION: RIGHT;LEFT

## 2024-07-29 ASSESSMENT — PAIN DESCRIPTION - DESCRIPTORS: DESCRIPTORS: ACHING

## 2024-07-29 NOTE — ED PROVIDER NOTES
Patient is euvolemic without trismus, change in voice, or evidence of airway compromise.  Given history and exam, I have low suspicion for this presentation being caused by PTA, RPA, Ludwigs angina, epiglottitis, bacterial tracheitis, EBV, acute HIV, or strep throat.  Rapid COVID positive.  I do not suspect underlying cardiopulmonary process.  Chest x-ray was performed which was negative for any acute findings making pneumonia and pneumothorax unlikely.  I considered, but think unlikely, dangerous causes of this patient's symptoms to include ACS, CHF, asthma/COPD exacerbations.  Patient is not in need of any emergent medical intervention at this time.  Patient is safe for discharge home.  Recommended taking ibuprofen/Tylenol as needed in addition to over-the-counter medications.  Discussed the importance of adequate rest and hydration.  Return precautions discussed with patient who expresses understanding and is in agreement with the current plan.  Recommend follow-up with primary care provider in 2 to 3 days.    Amount and/or Complexity of Data Reviewed  Radiology: ordered.    Risk  Prescription drug management.          FINAL IMPRESSION      1. COVID-19          DISPOSITION/PLAN   DISPOSITION Decision To Discharge 07/29/2024 05:35:40 PM      PATIENT REFERRED TO:  Your PCP    In 2 days      [facility] EMERGENCY DEP  [address] [place] [id number]  [phone]    As needed, If symptoms worsen      DISCHARGE MEDICATIONS:  New Prescriptions    No medications on file         (Please note that portions of this note were completed with a voice recognition program.  Efforts were made to edit the dictations but occasionally words are mis-transcribed.)    Shashank Putnam PA-C (electronically signed)  Emergency Attending Physician / Physician Assistant / Nurse Practitioner             Shashank Putnam PA-C  07/29/24 [id number]

## 2025-08-04 ENCOUNTER — HOSPITAL ENCOUNTER (EMERGENCY)
Facility: HOSPITAL | Age: 40
Discharge: HOME | End: 2025-08-04

## 2025-08-04 VITALS
BODY MASS INDEX: 29.03 KG/M2 | TEMPERATURE: 98.1 F | WEIGHT: 185 LBS | HEIGHT: 67 IN | SYSTOLIC BLOOD PRESSURE: 165 MMHG | DIASTOLIC BLOOD PRESSURE: 92 MMHG | HEART RATE: 70 BPM | OXYGEN SATURATION: 96 % | RESPIRATION RATE: 16 BRPM

## 2025-08-04 DIAGNOSIS — S46.812A STRAIN OF LEFT TRAPEZIUS MUSCLE, INITIAL ENCOUNTER: Primary | ICD-10-CM

## 2025-08-04 PROCEDURE — 99283 EMERGENCY DEPT VISIT LOW MDM: CPT

## 2025-08-04 RX ORDER — NAPROXEN 500 MG/1
500 TABLET ORAL 2 TIMES DAILY PRN
Qty: 20 TABLET | Refills: 0 | Status: SHIPPED | OUTPATIENT
Start: 2025-08-04

## 2025-08-04 RX ORDER — METHOCARBAMOL 750 MG/1
750 TABLET, FILM COATED ORAL EVERY 6 HOURS PRN
Qty: 30 TABLET | Refills: 0 | Status: SHIPPED | OUTPATIENT
Start: 2025-08-04 | End: 2025-08-14

## 2025-08-04 ASSESSMENT — PAIN DESCRIPTION - ORIENTATION: ORIENTATION: LEFT

## 2025-08-04 ASSESSMENT — PAIN SCALES - GENERAL: PAINLEVEL_OUTOF10: 7

## 2025-08-04 ASSESSMENT — PAIN - FUNCTIONAL ASSESSMENT: PAIN_FUNCTIONAL_ASSESSMENT: 0-10

## 2025-08-04 ASSESSMENT — PAIN DESCRIPTION - LOCATION: LOCATION: SHOULDER
